# Patient Record
Sex: MALE | Race: BLACK OR AFRICAN AMERICAN | NOT HISPANIC OR LATINO | Employment: FULL TIME | URBAN - METROPOLITAN AREA
[De-identification: names, ages, dates, MRNs, and addresses within clinical notes are randomized per-mention and may not be internally consistent; named-entity substitution may affect disease eponyms.]

---

## 2020-10-21 ENCOUNTER — PATIENT MESSAGE (OUTPATIENT)
Dept: ADMINISTRATIVE | Facility: OTHER | Age: 35
End: 2020-10-21

## 2020-10-21 ENCOUNTER — OFFICE VISIT (OUTPATIENT)
Dept: INTERNAL MEDICINE | Facility: CLINIC | Age: 35
End: 2020-10-21
Payer: COMMERCIAL

## 2020-10-21 VITALS
HEIGHT: 75 IN | WEIGHT: 304 LBS | HEART RATE: 77 BPM | SYSTOLIC BLOOD PRESSURE: 158 MMHG | OXYGEN SATURATION: 96 % | DIASTOLIC BLOOD PRESSURE: 112 MMHG | BODY MASS INDEX: 37.8 KG/M2

## 2020-10-21 DIAGNOSIS — M25.562 CHRONIC PAIN OF LEFT KNEE: ICD-10-CM

## 2020-10-21 DIAGNOSIS — M25.562 LEFT KNEE PAIN, UNSPECIFIED CHRONICITY: Primary | ICD-10-CM

## 2020-10-21 DIAGNOSIS — G89.29 CHRONIC PAIN OF LEFT KNEE: ICD-10-CM

## 2020-10-21 DIAGNOSIS — I10 BENIGN ESSENTIAL HYPERTENSION: ICD-10-CM

## 2020-10-21 DIAGNOSIS — Z76.89 ENCOUNTER TO ESTABLISH CARE WITH NEW DOCTOR: Primary | ICD-10-CM

## 2020-10-21 DIAGNOSIS — E11.9 TYPE 2 DIABETES MELLITUS WITHOUT COMPLICATION, WITHOUT LONG-TERM CURRENT USE OF INSULIN: ICD-10-CM

## 2020-10-21 PROCEDURE — 3080F PR MOST RECENT DIASTOLIC BLOOD PRESSURE >= 90 MM HG: ICD-10-PCS | Mod: CPTII,S$GLB,, | Performed by: INTERNAL MEDICINE

## 2020-10-21 PROCEDURE — 99999 PR PBB SHADOW E&M-NEW PATIENT-LVL IV: CPT | Mod: PBBFAC,,, | Performed by: INTERNAL MEDICINE

## 2020-10-21 PROCEDURE — 99204 OFFICE O/P NEW MOD 45 MIN: CPT | Mod: S$GLB,,, | Performed by: INTERNAL MEDICINE

## 2020-10-21 PROCEDURE — 3077F PR MOST RECENT SYSTOLIC BLOOD PRESSURE >= 140 MM HG: ICD-10-PCS | Mod: CPTII,S$GLB,, | Performed by: INTERNAL MEDICINE

## 2020-10-21 PROCEDURE — 3008F BODY MASS INDEX DOCD: CPT | Mod: CPTII,S$GLB,, | Performed by: INTERNAL MEDICINE

## 2020-10-21 PROCEDURE — 3008F PR BODY MASS INDEX (BMI) DOCUMENTED: ICD-10-PCS | Mod: CPTII,S$GLB,, | Performed by: INTERNAL MEDICINE

## 2020-10-21 PROCEDURE — 99999 PR PBB SHADOW E&M-NEW PATIENT-LVL IV: ICD-10-PCS | Mod: PBBFAC,,, | Performed by: INTERNAL MEDICINE

## 2020-10-21 PROCEDURE — 99204 PR OFFICE/OUTPT VISIT, NEW, LEVL IV, 45-59 MIN: ICD-10-PCS | Mod: S$GLB,,, | Performed by: INTERNAL MEDICINE

## 2020-10-21 PROCEDURE — 3077F SYST BP >= 140 MM HG: CPT | Mod: CPTII,S$GLB,, | Performed by: INTERNAL MEDICINE

## 2020-10-21 PROCEDURE — 3080F DIAST BP >= 90 MM HG: CPT | Mod: CPTII,S$GLB,, | Performed by: INTERNAL MEDICINE

## 2020-10-21 RX ORDER — IBUPROFEN 100 MG/5ML
1000 SUSPENSION, ORAL (FINAL DOSE FORM) ORAL DAILY
COMMUNITY
Start: 2020-10-21 | End: 2023-01-11

## 2020-10-21 RX ORDER — LANCETS
1 EACH MISCELLANEOUS
Qty: 200 EACH | Refills: 4 | Status: SHIPPED | OUTPATIENT
Start: 2020-10-21 | End: 2022-08-24 | Stop reason: SDUPTHER

## 2020-10-21 RX ORDER — INSULIN PUMP SYRINGE, 3 ML
EACH MISCELLANEOUS
Qty: 1 EACH | Refills: 0 | Status: SHIPPED | OUTPATIENT
Start: 2020-10-21 | End: 2023-05-03

## 2020-10-21 RX ORDER — METFORMIN HYDROCHLORIDE 1000 MG/1
1000 TABLET ORAL 2 TIMES DAILY WITH MEALS
Qty: 180 TABLET | Refills: 1 | Status: SHIPPED | OUTPATIENT
Start: 2020-10-21 | End: 2021-05-21 | Stop reason: SDUPTHER

## 2020-10-21 RX ORDER — ZINC GLUCONATE 50 MG
50 TABLET ORAL DAILY
COMMUNITY
Start: 2020-10-21 | End: 2023-01-11

## 2020-10-21 RX ORDER — ACETAMINOPHEN 500 MG
5000 TABLET ORAL DAILY
COMMUNITY
Start: 2020-10-21 | End: 2023-01-11

## 2020-10-21 RX ORDER — AMLODIPINE BESYLATE 10 MG/1
10 TABLET ORAL DAILY
Qty: 90 TABLET | Refills: 1 | Status: SHIPPED | OUTPATIENT
Start: 2020-10-21 | End: 2021-05-21 | Stop reason: SDUPTHER

## 2020-10-21 RX ORDER — LOSARTAN POTASSIUM 100 MG/1
100 TABLET ORAL DAILY
Qty: 90 TABLET | Refills: 1 | Status: SHIPPED | OUTPATIENT
Start: 2020-10-21 | End: 2021-05-21 | Stop reason: SDUPTHER

## 2020-10-21 NOTE — PROGRESS NOTES
INTERNAL MEDICINE CLINIC    Initial Visit to Establish Care    PRESENTING HISTORY     Previous PCP: Primary Doctor None   Chief Complaint/Reason for Visit:  Establish Care.    History of Present Illness & ROS : Mr. Josh Julio is a 35 y.o. male.      Last PCP at [facility].    Review of Systems:  Review of Systems   Constitutional: Negative for chills and fever.   Eyes: Negative for blurred vision.   Respiratory: Negative for cough and sputum production.    Cardiovascular: Negative for chest pain.   Gastrointestinal: Negative for heartburn and nausea.   Musculoskeletal: Positive for joint pain (Left knee). Negative for back pain.   Skin: Positive for rash.   Neurological: Negative for dizziness and headaches.   Endo/Heme/Allergies: Does not bruise/bleed easily.   Psychiatric/Behavioral: Negative for depression.       PAST HISTORY:     Past Medical History:   Diagnosis Date    Benign essential hypertension 10/21/2020    BMI 38.0-38.9,adult 10/21/2020    Essential hypertension     Type 2 diabetes mellitus without complication, without long-term current use of insulin 10/21/2020       History reviewed. No pertinent surgical history.    Family History   Problem Relation Age of Onset    No Known Problems Father     Diabetes Maternal Grandmother     Hypertension Maternal Grandmother        Social History     Socioeconomic History    Marital status: Single     Spouse name: Not on file    Number of children: 3    Years of education: Not on file    Highest education level: Not on file   Occupational History    Not on file   Social Needs    Financial resource strain: Not on file    Food insecurity     Worry: Not on file     Inability: Not on file    Transportation needs     Medical: Not on file     Non-medical: Not on file   Tobacco Use    Smoking status: Never Smoker    Smokeless tobacco: Never Used   Substance and Sexual Activity    Alcohol use: Yes     Frequency: 2-4 times a month     Comment: social     Drug use: No    Sexual activity: Yes     Partners: Female   Lifestyle    Physical activity     Days per week: Not on file     Minutes per session: Not on file    Stress: Not on file   Relationships    Social connections     Talks on phone: Not on file     Gets together: Not on file     Attends [employer] service: Not on file     Active member of club or organization: Not on file     Attends meetings of clubs or organizations: Not on file     Relationship status: Not on file   Other Topics Concern    Not on file   Social History Narrative    Maintenance for [employer]    Engaged.    3 Kids from previous relationship.       MEDICATIONS & ALLERGIES:     Current Outpatient Medications on File Prior to Visit   Medication Sig Dispense Refill    amlodipine (NORVASC) 10 MG tablet Take 1 tablet (10 mg total) by mouth once daily. 30 tablet 0     Review of patient's allergies indicates:  No Known Allergies    Medications Reconciliation:   I have reconciled the patient's home medications with the patient/family. I have updated all changes.  Refer to After-Visit Medication List.    OBJECTIVE:     Vital Signs:  Vitals:    10/21/20 1051   BP: (!) 158/112   Pulse: 77     Wt Readings from Last 1 Encounters:   10/21/20 1051 (!) 137.9 kg (304 lb)     Body mass index is 38 kg/m².     Physical Exam:  General: Well developed, well nourished. No distress.  HEENT: Head is normocephalic, atraumatic  Eyes: Clear conjunctiva.  Neck: Supple, symmetrical neck; trachea midline.  Lungs: Clear to auscultation bilaterally and normal respiratory effort.  Cardiovascular: Heart with regular rate and rhythm.    Extremities: No LE edema.    Abdomen: Abdomen is soft, non-tender non-distended with normal bowel sounds.  Musculoskeletal: Normal gait.   Genital:  Normal penis.   No rash in the genital area.  Scrotum and epididymis normal. No inguinal hernia.  No inguinal nodes.   Rectal: No perianal lesions or rash.   Psychiatric: Normal  affect. Alert.        ASSESSMENT & PLAN:   New patient who has not seen Primary Care Provider at [facility] for the past 3 years.  Patient is new to me. Medical, surgical, social, medication and allergy histories were obtained during this visit.    Encounter to establish care with new doctor  -     Lipid Panel; Future; Expected date: 10/21/2020  -     CBC auto differential; Future; Expected date: 10/21/2020  -     Comprehensive Metabolic Panel; Future; Expected date: 10/21/2020  -     TSH; Future; Expected date: 10/21/2020  -     Hemoglobin A1C; Future; Expected date: 10/21/2020  -     Vitamin D; Future; Expected date: 04/19/2021  -     Hepatitis C Antibody; Future; Expected date: 10/21/2020  -     HIV 1/2 Ag/Ab (4th Gen); Future; Expected date: 10/21/2020    Benign essential hypertension  - Uncontrolled.     Plan:    -     NURSING COMMUNICATION: Create [facility] Account  -     Hypertension Digital Medicine (HDMP) Enrollment Order  -     Hypertension Digital Medicine (HDMP): Assign Onboarding Questionnaires  -     amLODIPine (NORVASC) 10 MG tablet; Take 1 tablet (10 mg total) by mouth once daily.  Dispense: 90 tablet; Refill: 1  -     losartan (COZAAR) 100 MG tablet; Take 1 tablet (100 mg total) by mouth once daily.  Dispense: 90 tablet; Refill: 1    Type 2 diabetes mellitus without complication, without long-term current use of insulin  - Girlfriend states his blood sugar was 200s X 2 before meal.    Plan:  -     blood sugar diagnostic Strp; 1 strip by Misc.(Non-Drug; Combo Route) route 2 (two) times daily before meals.  Dispense: 200 strip; Refill: 4  -     lancets (LANCETS,ULTRA THIN) Misc; 1 application by Misc.(Non-Drug; Combo Route) route 2 (two) times daily before meals.  Dispense: 200 each; Refill: 4  -     blood-glucose meter kit; Use as instructed  Dispense: 1 each; Refill: 0  -     metFORMIN (GLUCOPHAGE) 1000 MG tablet; Take 1 tablet (1,000 mg total) by mouth 2 (two) times daily with meals.  Dispense: 180  tablet; Refill: 1      BMI 38.0-38.9,adult  -     ascorbic acid, vitamin C, (VITAMIN C) 1000 MG tablet; Take 1 tablet (1,000 mg total) by mouth once daily.  -     cholecalciferol, vitamin D3, 125 mcg (5,000 unit) Tab; Take 1 tablet (5,000 Units total) by mouth once daily.  -     zinc gluconate 50 mg tablet; Take 1 tablet (50 mg total) by mouth once daily.  Dispense:        Chronic pain of left knee  -     Ambulatory referral/consult to Orthopedics; Future; Expected date: 10/28/2020    Preventive Health Maintenance:  Tdap and Flu today.    Return to Clinic for Follow Up with me:   December    Scheduled Follow-up :  Future Appointments   Date Time Provider Department Center   10/22/2020  7:00 AM Kali Stanley PA-C [facility]   10/24/2020  9:20 AM [facility]   12/29/2020  3:30 PM Rodri Armstrong MD [facility]       After Visit Medication List :     Medication List          Accurate as of October 21, 2020 11:50 AM. If you have any questions, ask your nurse or doctor.            START taking these medications    ascorbic acid (vitamin C) 1000 MG tablet  Commonly known as: VITAMIN C  Take 1 tablet (1,000 mg total) by mouth once daily.  Started by: Rodri Armstrong MD     blood sugar diagnostic Strp  1 strip by Misc.(Non-Drug; Combo Route) route 2 (two) times daily before meals.  Started by: Rodri Armstrong MD     blood-glucose meter kit  Use as instructed  Started by: Rodri Armstrong MD     cholecalciferol (vitamin D3) 125 mcg (5,000 unit) Tab  Take 1 tablet (5,000 Units total) by mouth once daily.  Started by: Rodri Armstrong MD     lancets Misc  Commonly known as: LANCETS,ULTRA THIN  1 application by Misc.(Non-Drug; Combo Route) route 2 (two) times daily before meals.  Started by: Rodri Armstrong MD     losartan 100 MG tablet  Commonly known as: COZAAR  Take 1 tablet (100 mg total) by mouth once daily.  Started by: Rodri Armstrong MD     metFORMIN 1000 MG  tablet  Commonly known as: GLUCOPHAGE  Take 1 tablet (1,000 mg total) by mouth 2 (two) times daily with meals.  Started by: Rodri Armstrong MD     zinc gluconate 50 mg tablet  Take 1 tablet (50 mg total) by mouth once daily.  Started by: Rodri Armstrong MD        CONTINUE taking these medications    amLODIPine 10 MG tablet  Commonly known as: NORVASC  Take 1 tablet (10 mg total) by mouth once daily.        STOP taking these medications    cloNIDine 0.1 MG tablet  Commonly known as: CATAPRES  Stopped by: Rodri Armstrong MD     fluocinonide 0.05% 0.05 % cream  Commonly known as: LIDEX  Stopped by: Rodri Armstrong MD     triamcinolone acetonide 0.1% 0.1 % ointment  Commonly known as: KENALOG  Stopped by: Rodri Armstrong MD           Where to Get Your Medications      These medications were sent to [facility] [address] [place] [id number]    Phone: [phone]   · amLODIPine 10 MG tablet  · blood sugar diagnostic Strp  · blood-glucose meter kit  · lancets Misc  · losartan 100 MG tablet  · metFORMIN 1000 MG tablet     You can get these medications from any pharmacy    You don't need a prescription for these medications  · ascorbic acid (vitamin C) 1000 MG tablet  · cholecalciferol (vitamin D3) 125 mcg (5,000 unit) Tab  · zinc gluconate 50 mg tablet         Signing Physician:  Rodri Armstrong MD

## 2020-10-24 ENCOUNTER — PATIENT MESSAGE (OUTPATIENT)
Dept: INTERNAL MEDICINE | Facility: CLINIC | Age: 35
End: 2020-10-24

## 2020-10-24 ENCOUNTER — LAB VISIT (OUTPATIENT)
Dept: LAB | Facility: HOSPITAL | Age: 35
End: 2020-10-24
Attending: INTERNAL MEDICINE
Payer: COMMERCIAL

## 2020-10-24 ENCOUNTER — TELEPHONE (OUTPATIENT)
Dept: HEPATOLOGY | Facility: CLINIC | Age: 35
End: 2020-10-24

## 2020-10-24 DIAGNOSIS — I10 BENIGN ESSENTIAL HYPERTENSION: ICD-10-CM

## 2020-10-24 DIAGNOSIS — E11.65 TYPE 2 DIABETES MELLITUS WITH HYPERGLYCEMIA, WITHOUT LONG-TERM CURRENT USE OF INSULIN: Primary | ICD-10-CM

## 2020-10-24 DIAGNOSIS — E11.9 TYPE 2 DIABETES MELLITUS WITHOUT COMPLICATION, WITHOUT LONG-TERM CURRENT USE OF INSULIN: ICD-10-CM

## 2020-10-24 DIAGNOSIS — Z76.89 ENCOUNTER TO ESTABLISH CARE WITH NEW DOCTOR: ICD-10-CM

## 2020-10-24 DIAGNOSIS — E78.2 MIXED HYPERLIPIDEMIA: ICD-10-CM

## 2020-10-24 PROBLEM — E55.9 VITAMIN D INSUFFICIENCY: Status: ACTIVE | Noted: 2020-10-24

## 2020-10-24 LAB
25(OH)D3+25(OH)D2 SERPL-MCNC: 26 NG/ML (ref 30–96)
ALBUMIN SERPL BCP-MCNC: 4.1 G/DL (ref 3.5–5.2)
ALP SERPL-CCNC: 94 U/L (ref 55–135)
ALT SERPL W/O P-5'-P-CCNC: 34 U/L (ref 10–44)
ANION GAP SERPL CALC-SCNC: 10 MMOL/L (ref 8–16)
AST SERPL-CCNC: 28 U/L (ref 10–40)
BASOPHILS # BLD AUTO: 0.03 K/UL (ref 0–0.2)
BASOPHILS NFR BLD: 0.5 % (ref 0–1.9)
BILIRUB SERPL-MCNC: 1.1 MG/DL (ref 0.1–1)
BUN SERPL-MCNC: 16 MG/DL (ref 6–20)
CALCIUM SERPL-MCNC: 9.4 MG/DL (ref 8.7–10.5)
CHLORIDE SERPL-SCNC: 101 MMOL/L (ref 95–110)
CHOLEST SERPL-MCNC: 186 MG/DL (ref 120–199)
CHOLEST/HDLC SERPL: 4.7 {RATIO} (ref 2–5)
CO2 SERPL-SCNC: 27 MMOL/L (ref 23–29)
CREAT SERPL-MCNC: 1 MG/DL (ref 0.5–1.4)
DIFFERENTIAL METHOD: ABNORMAL
EOSINOPHIL # BLD AUTO: 0.1 K/UL (ref 0–0.5)
EOSINOPHIL NFR BLD: 2.2 % (ref 0–8)
ERYTHROCYTE [DISTWIDTH] IN BLOOD BY AUTOMATED COUNT: 15.9 % (ref 11.5–14.5)
EST. GFR  (AFRICAN AMERICAN): >60 ML/MIN/1.73 M^2
EST. GFR  (NON AFRICAN AMERICAN): >60 ML/MIN/1.73 M^2
ESTIMATED AVG GLUCOSE: 352 MG/DL (ref 68–131)
GLUCOSE SERPL-MCNC: 226 MG/DL (ref 70–110)
HBA1C MFR BLD HPLC: 13.9 % (ref 4–5.6)
HCT VFR BLD AUTO: 41.2 % (ref 40–54)
HDLC SERPL-MCNC: 40 MG/DL (ref 40–75)
HDLC SERPL: 21.5 % (ref 20–50)
HGB BLD-MCNC: 13.2 G/DL (ref 14–18)
IMM GRANULOCYTES # BLD AUTO: 0.02 K/UL (ref 0–0.04)
IMM GRANULOCYTES NFR BLD AUTO: 0.3 % (ref 0–0.5)
LDLC SERPL CALC-MCNC: 71.4 MG/DL (ref 63–159)
LYMPHOCYTES # BLD AUTO: 2.1 K/UL (ref 1–4.8)
LYMPHOCYTES NFR BLD: 36 % (ref 18–48)
MCH RBC QN AUTO: 24.3 PG (ref 27–31)
MCHC RBC AUTO-ENTMCNC: 32 G/DL (ref 32–36)
MCV RBC AUTO: 76 FL (ref 82–98)
MONOCYTES # BLD AUTO: 0.6 K/UL (ref 0.3–1)
MONOCYTES NFR BLD: 9.3 % (ref 4–15)
NEUTROPHILS # BLD AUTO: 3.1 K/UL (ref 1.8–7.7)
NEUTROPHILS NFR BLD: 51.7 % (ref 38–73)
NONHDLC SERPL-MCNC: 146 MG/DL
NRBC BLD-RTO: 0 /100 WBC
PLATELET # BLD AUTO: 264 K/UL (ref 150–350)
PLATELET BLD QL SMEAR: ABNORMAL
PMV BLD AUTO: 10 FL (ref 9.2–12.9)
POTASSIUM SERPL-SCNC: 4.2 MMOL/L (ref 3.5–5.1)
PROT SERPL-MCNC: 7.6 G/DL (ref 6–8.4)
RBC # BLD AUTO: 5.43 M/UL (ref 4.6–6.2)
SODIUM SERPL-SCNC: 138 MMOL/L (ref 136–145)
TRIGL SERPL-MCNC: 373 MG/DL (ref 30–150)
TSH SERPL DL<=0.005 MIU/L-ACNC: 1.77 UIU/ML (ref 0.4–4)
WBC # BLD AUTO: 5.94 K/UL (ref 3.9–12.7)

## 2020-10-24 PROCEDURE — 86703 HIV-1/HIV-2 1 RESULT ANTBDY: CPT

## 2020-10-24 PROCEDURE — 80053 COMPREHEN METABOLIC PANEL: CPT

## 2020-10-24 PROCEDURE — 86803 HEPATITIS C AB TEST: CPT

## 2020-10-24 PROCEDURE — 84443 ASSAY THYROID STIM HORMONE: CPT

## 2020-10-24 PROCEDURE — 85025 COMPLETE CBC W/AUTO DIFF WBC: CPT

## 2020-10-24 PROCEDURE — 82306 VITAMIN D 25 HYDROXY: CPT

## 2020-10-24 PROCEDURE — 83036 HEMOGLOBIN GLYCOSYLATED A1C: CPT

## 2020-10-24 PROCEDURE — 80061 LIPID PANEL: CPT

## 2020-10-24 PROCEDURE — 36415 COLL VENOUS BLD VENIPUNCTURE: CPT

## 2020-10-24 RX ORDER — ATORVASTATIN CALCIUM 10 MG/1
10 TABLET, FILM COATED ORAL DAILY
Qty: 90 TABLET | Refills: 3 | Status: SHIPPED | OUTPATIENT
Start: 2020-10-24 | End: 2021-05-21 | Stop reason: SDUPTHER

## 2020-10-24 RX ORDER — DULAGLUTIDE 1.5 MG/.5ML
1.5 INJECTION, SOLUTION SUBCUTANEOUS
Qty: 12 PEN | Refills: 11 | Status: SHIPPED | OUTPATIENT
Start: 2020-10-24 | End: 2021-06-17

## 2020-10-24 NOTE — PROGRESS NOTES
Josh:    Your A1c is > 13. Very controlled and serious diabetes.    I have called in prescription for:    1. Trulicity 1.5 mg injection.  This is once a weekly injection for diabetes and weight loss.  2. Atorvastatin for cholesterol problem and diabetes.    I am also asking our diabetes specialist, Sudarshan Soto, in our clinic to arrange appointment to see you for further diabetes management.

## 2020-10-26 ENCOUNTER — PATIENT OUTREACH (OUTPATIENT)
Dept: OTHER | Facility: OTHER | Age: 35
End: 2020-10-26

## 2020-10-26 PROBLEM — R76.8 HCV ANTIBODY POSITIVE: Status: ACTIVE | Noted: 2020-10-26

## 2020-10-26 LAB
HCV AB SERPL QL IA: POSITIVE
HIV 1+2 AB+HIV1 P24 AG SERPL QL IA: NEGATIVE

## 2020-10-28 ENCOUNTER — PATIENT OUTREACH (OUTPATIENT)
Dept: OTHER | Facility: OTHER | Age: 35
End: 2020-10-28

## 2020-11-04 ENCOUNTER — NURSE TRIAGE (OUTPATIENT)
Dept: ADMINISTRATIVE | Facility: CLINIC | Age: 35
End: 2020-11-04

## 2020-11-04 NOTE — TELEPHONE ENCOUNTER
Pt stated he took 2 benadryl for the swelling, but it didn't help. Also stated he feels like a lump behind the ear. Pt scheduled with DEVEN Kurtz on tomorrow November 5th.

## 2020-11-04 NOTE — PROGRESS NOTES
"INTERNAL MEDICINE CLINIC - SAME DAY APPOINTMENT  Progress Note    PRESENTING HISTORY     PCP: Rodri Armstrong MD  Chief Complaint/Reason for Visit:   No chief complaint on file.    History of Present Illness & ROS : Mr. Josh Julio is a 35 y.o. male.    Here for Same Day appt. Very pleasant young man.   New to this Provider. Recently est'd care with Dr. ADALBERTO Armstrong.   Reports that his fiance' noticed that he has some "swelling to his ear and behind the ear".   Patient reports that "didn't know it was there, but get scaly rash behind the right ear every now and then, to my scalp and eyebrows as well". Denies pain to the area, ear pain, ringing in the ear.   Has not tried anything for it.       Review of Systems:  Eyes: denies visual changes at this time denies floaters   ENT: no nasal congestion or sore throat  Respiratory: no cough or shorness of breath  Cardiovascular: no chest pain or palpitations  Gastrointestinal: no nausea or vomiting, no abdominal pain or change in bowel habits  Genitourinary: no hematuria or dysuria; denies frequency  Hematologic/Lymphatic: no easy bruising or lymphadenopathy  Musculoskeletal: no arthralgias or myalgias  Neurological: no seizures or tremors  Endocrine: no heat or cold intolerance      PAST HISTORY:     Past Medical History:   Diagnosis Date    Benign essential hypertension 10/21/2020    BMI 38.0-38.9,adult 10/21/2020    Essential hypertension     Mixed hyperlipidemia 10/24/2020    Type 2 diabetes mellitus without complication, without long-term current use of insulin 10/21/2020    Vitamin D insufficiency 10/24/2020       No past surgical history on file.    Family History   Problem Relation Age of Onset    No Known Problems Father     Diabetes Maternal Grandmother     Hypertension Maternal Grandmother        Social History     Socioeconomic History    Marital status: Single     Spouse name: Not on file    Number of children: 3    Years of education: Not on file    " Highest education level: Not on file   Occupational History    Not on file   Social Needs    Financial resource strain: Not on file    Food insecurity     Worry: Not on file     Inability: Not on file    Transportation needs     Medical: Not on file     Non-medical: Not on file   Tobacco Use    Smoking status: Never Smoker    Smokeless tobacco: Never Used   Substance and Sexual Activity    Alcohol use: Yes     Frequency: 2-4 times a month     Comment: social    Drug use: No    Sexual activity: Yes     Partners: Female   Lifestyle    Physical activity     Days per week: Not on file     Minutes per session: Not on file    Stress: Not on file   Relationships    Social connections     Talks on phone: Not on file     Gets together: Not on file     Attends [employer] service: Not on file     Active member of club or organization: Not on file     Attends meetings of clubs or organizations: Not on file     Relationship status: Not on file   Other Topics Concern    Not on file   Social History Narrative    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    3 Kids from previous relationship.       MEDICATIONS & ALLERGIES:     Current Outpatient Medications on File Prior to Visit   Medication Sig Dispense Refill    amLODIPine (NORVASC) 10 MG tablet Take 1 tablet (10 mg total) by mouth once daily. 90 tablet 1    ascorbic acid, vitamin C, (VITAMIN C) 1000 MG tablet Take 1 tablet (1,000 mg total) by mouth once daily.      atorvastatin (LIPITOR) 10 MG tablet Take 1 tablet (10 mg total) by mouth once daily. 90 tablet 3    blood sugar diagnostic Strp 1 strip by Misc.(Non-Drug; Combo Route) route 2 (two) times daily before meals. 200 strip 4    blood-glucose meter kit Use as instructed 1 each 0    cholecalciferol, vitamin D3, 125 mcg (5,000 unit) Tab Take 1 tablet (5,000 Units total) by mouth once daily.      dulaglutide (TRULICITY) 1.5 mg/0.5 mL pen injector Inject 1.5 mg into the skin every 7 days. 12 pen 11     lancets (LANCETS,ULTRA THIN) Misc 1 application by Misc.(Non-Drug; Combo Route) route 2 (two) times daily before meals. 200 each 4    losartan (COZAAR) 100 MG tablet Take 1 tablet (100 mg total) by mouth once daily. 90 tablet 1    metFORMIN (GLUCOPHAGE) 1000 MG tablet Take 1 tablet (1,000 mg total) by mouth 2 (two) times daily with meals. 180 tablet 1    zinc gluconate 50 mg tablet Take 1 tablet (50 mg total) by mouth once daily.       No current facility-administered medications on file prior to visit.         Review of patient's allergies indicates:  No Known Allergies    Medications Reconciliation:   I have reconciled the patient's home medications with the patient/family. I have updated all changes.  Refer to After-Visit Medication List.    OBJECTIVE:     Vital Signs:  There were no vitals filed for this visit.  Wt Readings from Last 1 Encounters:   10/21/20 1051 (!) 137.9 kg (304 lb)     There is no height or weight on file to calculate BMI.     Wt Readings from Last 3 Encounters:   11/05/20 (!) 137.4 kg (302 lb 14.6 oz)   10/21/20 (!) 137.9 kg (304 lb)   09/07/16 (!) 140.6 kg (310 lb)     Temp Readings from Last 3 Encounters:   09/07/16 98.2 °F (36.8 °C) (Oral)   12/05/15 98.9 °F (37.2 °C) (Oral)   04/04/13 98.5 °F (36.9 °C) (Oral)     BP Readings from Last 3 Encounters:   11/05/20 (!) 150/112   10/21/20 (!) 158/112   09/07/16 126/82     Pulse Readings from Last 3 Encounters:   11/05/20 90   10/21/20 77   09/07/16 85         Physical Exam:  General: Well developed, well nourished. No distress.  HEENT: Head is normocephalic, atraumatic; ears are normal.   Eyes: Clear conjunctiva.  Neck: Supple, symmetrical neck; trachea midline.  Lungs: Clear to auscultation bilaterally and normal respiratory effort.  Cardiovascular: Heart with regular rate and rhythm. No murmurs, gallops or rubs  Extremities: No LE edema. Pulses 2+ and symmetric.   Abdomen: Abdomen is soft, non-tender non-distended with normal bowel  sounds.  Skin: Skin color, texture, turgor normal.   (See photos below)  Musculoskeletal: Normal gait.   Lymph Nodes: No cervical or supraclavicular adenopathy.  Neurologic: Normal strength and tone. No focal numbness or weakness.   Psychiatric: Not depressed.        Laboratory  Lab Results   Component Value Date    WBC 5.94 10/24/2020    HGB 13.2 (L) 10/24/2020    HCT 41.2 10/24/2020     10/24/2020    CHOL 186 10/24/2020    TRIG 373 (H) 10/24/2020    HDL 40 10/24/2020    ALT 34 10/24/2020    AST 28 10/24/2020     10/24/2020    K 4.2 10/24/2020     10/24/2020    CREATININE 1.0 10/24/2020    BUN 16 10/24/2020    CO2 27 10/24/2020    TSH 1.768 10/24/2020    HGBA1C 13.9 (H) 10/24/2020         ASSESSMENT & PLAN:     Here for Same Day appt.             Seborrheic dermatitis /   Seborrheic dermatitis of scalp    Other orders  -     ketoconazole (NIZORAL) 2 % cream; Apply topically once daily.  Dispense: 1 Tube; Refill: 3  -     ketoconazole (NIZORAL) 2 % shampoo; Apply topically twice a week.  Dispense: 240 mL; Refill: 3    *Advised to follow up with Dr. ADALBERTO Armstrong or I with his response to tx rendered. Advised to continue the applications for a minimum of 2 weeks, but if not resolved to extend for a full 4 weeks of tx.   If does not improve, consider referral to Dermatology.       Future Appointments   Date Time Provider Department Center   11/9/2020 10:00 AM Lincoln Cox MD [facility]   12/29/2020  3:30 PM Rodri Armstrong MD [facility]        Medication List          Accurate as of November 5, 2020 10:08 AM. If you have any questions, ask your nurse or doctor.            START taking these medications    * ketoconazole 2 % cream  Commonly known as: NIZORAL  Apply topically once daily.  Started by: CRISTOPHER Shields     * ketoconazole 2 % shampoo  Commonly known as: NIZORAL  Apply topically twice a week.  Started by: CRISTOPHER Shields         * This list has 2  medication(s) that are the same as other medications prescribed for you. Read the directions carefully, and ask your doctor or other care provider to review them with you.            CONTINUE taking these medications    amLODIPine 10 MG tablet  Commonly known as: NORVASC  Take 1 tablet (10 mg total) by mouth once daily.     ascorbic acid (vitamin C) 1000 MG tablet  Commonly known as: VITAMIN C  Take 1 tablet (1,000 mg total) by mouth once daily.     atorvastatin 10 MG tablet  Commonly known as: LIPITOR  Take 1 tablet (10 mg total) by mouth once daily.     blood sugar diagnostic Strp  1 strip by Misc.(Non-Drug; Combo Route) route 2 (two) times daily before meals.     blood-glucose meter kit  Use as instructed     cholecalciferol (vitamin D3) 125 mcg (5,000 unit) Tab  Take 1 tablet (5,000 Units total) by mouth once daily.     lancets Misc  Commonly known as: LANCETS,ULTRA THIN  1 application by Misc.(Non-Drug; Combo Route) route 2 (two) times daily before meals.     losartan 100 MG tablet  Commonly known as: COZAAR  Take 1 tablet (100 mg total) by mouth once daily.     metFORMIN 1000 MG tablet  Commonly known as: GLUCOPHAGE  Take 1 tablet (1,000 mg total) by mouth 2 (two) times daily with meals.     TRULICITY 1.5 mg/0.5 mL pen injector  Generic drug: dulaglutide  Inject 1.5 mg into the skin every 7 days.     zinc gluconate 50 mg tablet  Take 1 tablet (50 mg total) by mouth once daily.           Where to Get Your Medications      These medications were sent to [facility]  [address] [facility]  [address] [address]    Phone: [phone]   · ketoconazole 2 % cream  · ketoconazole 2 % shampoo         Signing Physician:  CRISTOPHER Shields

## 2020-11-04 NOTE — TELEPHONE ENCOUNTER
Spoke with pt:   Ear is swollen- swelling is pushing ear out. Behind ear is swollen, feels bump. Glucose= 122 noticed Monday- took benadryl- did not change it.     Has concerns about are any new meds he is taking is causing this. Ear with no pain,no drainage, no fever, closed skin, no redness. No trouble swallowing or breathing.     Protocol advice given and pt verbalizes understanding. Also instructed pt to download [employer] [place] to phone and gave 877 IT number for help with [employer].         Reason for Disposition   Swelling of lymph node suspected   Normal-sized lymph node (i.e., < 1 cm, < 1/2 inch)    Additional Information   Negative: Sounds like a life-threatening emergency to the triager   Negative: Sounds like a life-threatening emergency to the triager   Negative: Node is in the neck and causes difficulty breathing   Negative: Patient sounds very sick or weak to the triager   Negative: Node is in the neck and can't swallow fluids   Negative: Fever > 103 F (39.4 C)   Negative: Lump or swelling in groin and pulsating (like heartbeat)   Negative: Single large node and size > 1 inch (2.5 cm)   Negative: Overlying skin is red   Negative: Rapid increase in size of node over several hours   Negative: Tender node in the groin and has a sore, scratch, cut, or painful red area on that leg   Negative: Tender node in the armpit and has a sore, scratch, cut, or painful red area on that arm   Negative: Tender node in the neck and also has a sore throat with minimal/no runny nose or cough   Negative: Fever present > 3 days (72 hours)   Negative: Large nodes at multiple locations   Negative: Very tender to the touch but no fever   Negative: Patient wants to be seen   Negative: Large node present > 2 weeks   Negative: Normal-sized node (i.e. < 1 cm, <1/2 in) present > 2 weeks, but patient is worried about cancer   Negative: Mildly swollen lymph node present < 2 weeks, and has cold symptoms (e.g., runny  nose, cough, sore throat)    Protocols used: SKIN LUMP OR LOCALIZED SWELLING-A-AH, LYMPH NODES - JWDPRPH-O-DE

## 2020-11-05 ENCOUNTER — OFFICE VISIT (OUTPATIENT)
Dept: INTERNAL MEDICINE | Facility: CLINIC | Age: 35
End: 2020-11-05
Payer: COMMERCIAL

## 2020-11-05 VITALS
HEART RATE: 90 BPM | SYSTOLIC BLOOD PRESSURE: 150 MMHG | HEIGHT: 75 IN | BODY MASS INDEX: 37.67 KG/M2 | WEIGHT: 302.94 LBS | OXYGEN SATURATION: 99 % | DIASTOLIC BLOOD PRESSURE: 112 MMHG

## 2020-11-05 DIAGNOSIS — L21.9 SEBORRHEIC DERMATITIS: Primary | ICD-10-CM

## 2020-11-05 DIAGNOSIS — L21.9 SEBORRHEIC DERMATITIS OF SCALP: ICD-10-CM

## 2020-11-05 PROCEDURE — 3080F DIAST BP >= 90 MM HG: CPT | Mod: CPTII,S$GLB,, | Performed by: NURSE PRACTITIONER

## 2020-11-05 PROCEDURE — 99214 OFFICE O/P EST MOD 30 MIN: CPT | Mod: S$GLB,,, | Performed by: NURSE PRACTITIONER

## 2020-11-05 PROCEDURE — 99999 PR PBB SHADOW E&M-EST. PATIENT-LVL IV: ICD-10-PCS | Mod: PBBFAC,,, | Performed by: NURSE PRACTITIONER

## 2020-11-05 PROCEDURE — 3080F PR MOST RECENT DIASTOLIC BLOOD PRESSURE >= 90 MM HG: ICD-10-PCS | Mod: CPTII,S$GLB,, | Performed by: NURSE PRACTITIONER

## 2020-11-05 PROCEDURE — 99214 PR OFFICE/OUTPT VISIT, EST, LEVL IV, 30-39 MIN: ICD-10-PCS | Mod: S$GLB,,, | Performed by: NURSE PRACTITIONER

## 2020-11-05 PROCEDURE — 3008F BODY MASS INDEX DOCD: CPT | Mod: CPTII,S$GLB,, | Performed by: NURSE PRACTITIONER

## 2020-11-05 PROCEDURE — 99999 PR PBB SHADOW E&M-EST. PATIENT-LVL IV: CPT | Mod: PBBFAC,,, | Performed by: NURSE PRACTITIONER

## 2020-11-05 PROCEDURE — 3077F PR MOST RECENT SYSTOLIC BLOOD PRESSURE >= 140 MM HG: ICD-10-PCS | Mod: CPTII,S$GLB,, | Performed by: NURSE PRACTITIONER

## 2020-11-05 PROCEDURE — 3008F PR BODY MASS INDEX (BMI) DOCUMENTED: ICD-10-PCS | Mod: CPTII,S$GLB,, | Performed by: NURSE PRACTITIONER

## 2020-11-05 PROCEDURE — 3077F SYST BP >= 140 MM HG: CPT | Mod: CPTII,S$GLB,, | Performed by: NURSE PRACTITIONER

## 2020-11-05 RX ORDER — KETOCONAZOLE 20 MG/G
CREAM TOPICAL DAILY
Qty: 1 TUBE | Refills: 3 | Status: SHIPPED | OUTPATIENT
Start: 2020-11-05 | End: 2022-08-17

## 2020-11-05 RX ORDER — KETOCONAZOLE 20 MG/ML
SHAMPOO, SUSPENSION TOPICAL
Qty: 240 ML | Refills: 3 | Status: SHIPPED | OUTPATIENT
Start: 2020-11-05 | End: 2022-07-28 | Stop reason: SDUPTHER

## 2020-11-12 NOTE — PROGRESS NOTES
Digital Medicine: Health  Introduction    Introduced Josh Julio to Digital Medicine. Discussed health  role and recommended lifestyle modifications.    The history is provided by the patient.           Additional Enrollment Details: Reviewed recent readings with patient to ensure they are coming through correctly. He states he has been very busy with work so he has not take readings lately but is agreeable to start. He normally takes his antihypertensives with breakfast around 7 AM. Discussed taking readings 1+ hr after medications. Identified that evening may be best because he works 8-5.     HYPERTENSION  Explained hypertension digital medicine goals including BP goal less than or equal to 130/80mmHg, improved convenience of BP management and reduced risk of heart attack, kidney failure, stroke, eye disease, dementia, and death.      Explained non-pharmacologic therapies like low salt diet and physical activity can reduce blood pressure. .      Explained that we expect patient to submit several blood pressure readings per week at random times of the day, but at least 30 minutes after taking blood pressure medications. Instructed patient not to allow anyone else to use their blood pressure monitor and phone as data submitted is directly entered into medical record. Reviewed and confirmed appropriate blood pressure monitoring technique.         Patient's BP goal is less than or equal to 130/80.Patient's BP average is 166/115 mmHg, which is above goal, per 2017 ACC/AHA Hypertension Guidelines.    Explained to the patient that the Digital Medicine team is not available for emergencies. Advised patient call [facility] ([phone] or [phone]) or [phone] if needed.                 Diet-Assessed  24 hour dietary recall  Breakfast is typically between 7-8 am. Grits with cheese and smoked sausage from corner store.  Lunch is typically between. Chicken nuggets from [employer] or [employer].   Dinner  is typically between. Salad with ranch or chicken salad, prepared at home.     Additional diet details: We discussed lower sodium alternatives for the foods he is currently eating. Patient is agreeable to see what other breakfast foods are available at the corner store, switch to grilled chicken or homemade lunch, and choose a lower sodium salad dressing. Patient is agreeable to receive resources for low sodium salad dressings and tips on eating healthily when dining out.     Patient states he reads food labels and feels like everything has sugar in it. He tries to watch this to control his blood sugar.     He does not get fries or a drink from [employer] or [employer], I encouraged him to continue limiting this.    Physical Activity-Not assessed    Medication Adherence-Medication Adherence not addressed.      Substance, Sleep, Stress-Not assessed      Additional monitoring needed.  Provided patient education.  Reviewed Device Techniques.     Addressed patient questions and patient has my contact information if needed prior to next outreach. Patient verbalizes understanding.      Explained the importance of self-monitoring and medication adherence. Encouraged the patient to communicate with their health  for lifestyle modifications to help improve or maintain a healthy lifestyle.        Sent link to [facility] webpages and my contact information via "[employer] for future questions.        Explained to the patient that the Digital Medicine team is not available for emergencies. Advised patient call [facility] ([phone] or [phone]) or [phone] if needed.                Topic    Eye Exam          Last 5 Patient Entered Readings                                      Current 30 Day Average: 166/115     Recent Readings 10/26/2020 10/23/2020 10/23/2020 10/22/2020 10/22/2020    SBP (mmHg) 164 168 174 151 164    DBP (mmHg) 112 109 120 108 114    Pulse 104 104 91 111 92

## 2020-11-16 NOTE — PROGRESS NOTES
Digital Medicine: Clinician Introduction    Josh Julio is a 35 y.o. male who is newly enrolled in the Digital Medicine Clinic.    Spoke with patient regarding introduction to [facility].    Patient states he has been busy with constantly working and plans to resume monitoring. Advised patient to charge cuff prior to monitoring. He confirms current medications  And states he takes them every morning after eating.    The history is provided by the patient.      Review of patient's allergies indicates:  No Known Allergies  Completed Medication Reconciliation  Verified pharmacy information.    HYPERTENSION  Explained hypertension digital medicine goals including BP goal less than or equal to 130/80mmHg, improved convenience of BP management and reduced risk of heart attack, kidney failure, stroke, eye disease, dementia, and death.     Explained non-pharmacologic therapies like low salt diet and physical activity can reduce blood pressure.       Explained that we expect patient to submit several blood pressure readings per week at random times of the day, but at least 30 minutes after taking blood pressure medications. Instructed patient not to allow anyone else to use their blood pressure monitor and phone as data submitted is directly entered into medical record. Reviewed and confirmed appropriate blood pressure monitoring technique.         Reviewed signs/symptoms of hypertension (headache, changes in vision, chest pain, shortness of breath)   Reviewed signs/symptoms of hypotension (lightheaded, dizziness, weakness)     Patient's BP goal is less than or equal to 130/80. Patients BP average is 166/115 mmHg, which is above goal, per 2017 ACC/AHA Hypertension Guidelines.     Last 5 Patient Entered Readings                                      Current 30 Day Average: 166/115     Recent Readings 10/26/2020 10/23/2020 10/23/2020 10/22/2020 10/22/2020    SBP (mmHg) 164 168 174 151 164    DBP (mmHg) 112 109 120 108 114     Pulse 104 104 91 111 92          Depression Screening  Josh Julio did not answer the depression screening.     Sleep Apnea Screening  Patient not previously diagnosed with SOPHIA       Medication Affordability Screening  Patient did not answer the medication affordability questionnaires. Patient is currently not having problems affording medications    Medication Adherence-Medication adherence was assessed.  Patient continue taking medication as prescribed.            ASSESSMENT(S)  Patients BP average is 166/115 mmHg, which is above goal. Patient's BP goal is less than or equal to 130/80.     Hypertension Plan  Additional monitoring needed.  Continue current therapy.  Instructed to charge device.  Provided patient education.  Consider alternate ARB and/or addition of thiazide for additional blood pressure lowering. CMP wnl 10/24/2020.  Reviewed cuff charging recommendations with patient - charge cuff for ~8 hours once every month or when battery life reaches 25% or below.     Addressed patient questions and patient has my contact information if needed prior to next outreach. Patient verbalizes understanding.      Explained the importance of self-monitoring and medication adherence. Encouraged the patient to communicate with their health  for lifestyle modifications to help improve or maintain a healthy lifestyle.        Sent link to [facility] webpages and my contact information via [employer] for future questions.        Explained to the patient that the Digital Medicine team is not available for emergencies. Advised patient call [facility] ([phone] or [phone]) or [phone] if needed.                Topic    Eye Exam           Current Medication Regimen:  Hypertension Medications             amLODIPine (NORVASC) 10 MG tablet Take 1 tablet (10 mg total) by mouth once daily.    losartan (COZAAR) 100 MG tablet Take 1 tablet (100 mg total) by mouth once daily.

## 2020-11-17 ENCOUNTER — PATIENT OUTREACH (OUTPATIENT)
Dept: ADMINISTRATIVE | Facility: OTHER | Age: 35
End: 2020-11-17

## 2020-11-17 DIAGNOSIS — E11.65 TYPE 2 DIABETES MELLITUS WITH HYPERGLYCEMIA, WITHOUT LONG-TERM CURRENT USE OF INSULIN: Primary | ICD-10-CM

## 2020-11-17 NOTE — PROGRESS NOTES
Health Maintenance Due   Topic Date Due    Foot Exam  05/10/1993    Eye Exam  05/10/1993    TETANUS VACCINE  05/10/2001    Pneumococcal Vaccine (Medium Risk) (1 of 1 - PPSV23) 05/10/2002    Influenza Vaccine (1) 08/01/2020     Updates were requested from care everywhere.  Chart was reviewed for overdue Proactive [facility] Encounters (IVANA) topics (CRS, Breast Cancer Screening, Eye exam)  Health Maintenance has been updated.  LINKS immunization registry triggered.  Immunizations were reconciled.  Order placed for diabetic eye screening photo.

## 2020-12-01 ENCOUNTER — PATIENT OUTREACH (OUTPATIENT)
Dept: OTHER | Facility: OTHER | Age: 35
End: 2020-12-01

## 2020-12-01 DIAGNOSIS — I10 BENIGN ESSENTIAL HYPERTENSION: Primary | ICD-10-CM

## 2020-12-08 ENCOUNTER — PATIENT OUTREACH (OUTPATIENT)
Dept: OTHER | Facility: OTHER | Age: 35
End: 2020-12-08

## 2020-12-08 NOTE — PROGRESS NOTES
Health  attempted outreach today. Will defer outreach and follow-up.    Last 5 Patient Entered Readings                                      Current 30 Day Average: 149/98     Recent Readings 11/20/2020 11/19/2020 10/26/2020 10/23/2020 10/23/2020    SBP (mmHg) 158 139 164 168 174    DBP (mmHg) 119 76 112 109 120    Pulse 95 140 104 104 91

## 2020-12-15 RX ORDER — HYDROCHLOROTHIAZIDE 12.5 MG/1
12.5 TABLET ORAL DAILY
Qty: 30 TABLET | Refills: 2 | Status: SHIPPED | OUTPATIENT
Start: 2020-12-15 | End: 2021-05-21 | Stop reason: SDUPTHER

## 2020-12-15 NOTE — PROGRESS NOTES
Digital Medicine: Clinician Follow-Up    Called patient to review HDMP readings.    The history is provided by the patient.   Follow-up reason(s): routine follow up.     Hypertension    Readings are trending up   Additional Follow-up details: Patient confirms charging cuff after our last encounter. He is not sure what may be contributing to elevated readings and states he is trying to get his weight under control. He is agreeable to medication adjustments for better blood pressure control.      Last 5 Patient Entered Readings                                      Current 30 Day Average: 158/108     Recent Readings 12/11/2020 12/9/2020 11/20/2020 11/19/2020 10/26/2020    SBP (mmHg) 174 160 158 139 164    DBP (mmHg) 126 112 119 76 112    Pulse 92 99 95 140 104          Depression Screening  Did not address depression screening.    Sleep Apnea Screening    Did not address sleep apnea screening.     Medication Affordability Screening  Did not address medication affordability screening.     Medication Adherence-Medication adherence was assessed.          ASSESSMENT(S)  Patients BP average is 158/108 mmHg, which is above goal. Patient's BP goal is less than or equal to 130/80.     Hypertension Plan  Hypertension Medication Change. Beging taking HCTZ 12.5mg daily. Continue taking other medications.  Labs ordered. Repeat bmp. Expected Lab Date: 12/29/2020  Provided patient education.       Addressed patient questions and patient has my contact information if needed prior to next outreach. Patient verbalizes understanding.                 Topic    Eye Exam      There are no preventive care reminders to display for this patient.      Hypertension Medications             amLODIPine (NORVASC) 10 MG tablet Take 1 tablet (10 mg total) by mouth once daily.    losartan (COZAAR) 100 MG tablet Take 1 tablet (100 mg total) by mouth once daily.

## 2020-12-18 ENCOUNTER — PATIENT OUTREACH (OUTPATIENT)
Dept: ADMINISTRATIVE | Facility: OTHER | Age: 35
End: 2020-12-18

## 2020-12-18 NOTE — PROGRESS NOTES
Health Maintenance Due   Topic Date Due    Foot Exam  05/10/1993    Eye Exam  05/10/1993    TETANUS VACCINE  05/10/2001    Pneumococcal Vaccine (Medium Risk) (1 of 1 - PPSV23) 05/10/2002    Influenza Vaccine (1) 08/01/2020     Updates were requested from care everywhere.  Chart was reviewed for overdue Proactive [facility] Encounters (IVANA) topics (CRS, Breast Cancer Screening, Eye exam)  Health Maintenance has been updated.  LINKS immunization registry triggered.  Immunizations were reconciled.

## 2020-12-28 NOTE — PROGRESS NOTES
Digital Medicine: Health  Follow-Up    The history is provided by the patient.             Reason for review: Blood pressure not at goal    Patient needs assistance troubleshooting: patient reminder needed.      Topics Covered on Call: Diet    Additional Follow-up details: Patient states he has not started taking HCTZ because he thought he was support to stop one of his other medications and was not sure which to stop. Informed patient that Paco's note indicates to start HCTZ and continue other medications. Patient verbalizes understanding.    He has not taken a BP reading recently due to being busy with working two jobs. He states he does not have 45-60 minutes to rest before readings so he has not been taking any. Clarified that we recommend resting 5-10 minutes prior to readings. Discussed timing readings at least 45-60 minutes after medications. He takes medications in the mornings and states he can begin taking readings in the evening after resting for 5 minutes.    Encouraged him to contact us with any questions or concerns. Patient requests I send clinician's phone number in text message.              Diet-Change      Dietary Improvements:Patient reports working on carb reduction - avoiding rice, bread. He switched his breakfast order to eggs on wheat with light milton. He reports getting salad or grilled chicken for lunch at [employer].            Intervention(s): carb reduction and DASH diet/sodium reduction education  Additional diet details: Encouraged continued effort to reduce sodium in diet and avoid adding salt to food.     Physical Activity-Not assessed    Medication Adherence-        Patient was unclear on how to take medication. I clarified directions. Will inform clinician of this in case further direction is needed.  Substance, Sleep, Stress-Not assessed      Additional monitoring needed.  Continue current diet/physical activity routine.  Provided patient education.  Reviewed Device  Techniques.     Addressed patient questions and patient has my contact information if needed prior to next outreach. Patient verbalizes understanding.      Explained the importance of self-monitoring and medication adherence. Encouraged the patient to communicate with their health  for lifestyle modifications to help improve or maintain a healthy lifestyle.                   Topic    Eye Exam          Last 5 Patient Entered Readings                                      Current 30 Day Average: 167/119     Recent Readings 12/11/2020 12/9/2020 11/20/2020 11/19/2020 10/26/2020    SBP (mmHg) 174 160 158 139 164    DBP (mmHg) 126 112 119 76 112    Pulse 92 99 95 140 104

## 2020-12-28 NOTE — PROGRESS NOTES
Patient informed health  that he has not started taking HCTZ because he thought he was supposed to stop taking one of his medications and could not remember which one. Patient reports he will start taking it today. Has upcoming appointment with Dr. Armstrong tomorrow. Will follow-up on medication change in 2-3 weeks.    Last 5 Patient Entered Readings                                      Current 30 Day Average: 167/119     Recent Readings 12/11/2020 12/9/2020 11/20/2020 11/19/2020 10/26/2020    SBP (mmHg) 174 160 158 139 164    DBP (mmHg) 126 112 119 76 112    Pulse 92 99 95 140 104

## 2020-12-29 ENCOUNTER — PATIENT MESSAGE (OUTPATIENT)
Dept: INTERNAL MEDICINE | Facility: CLINIC | Age: 35
End: 2020-12-29

## 2021-04-06 ENCOUNTER — TELEPHONE (OUTPATIENT)
Dept: INTERNAL MEDICINE | Facility: CLINIC | Age: 36
End: 2021-04-06

## 2021-04-16 ENCOUNTER — PATIENT MESSAGE (OUTPATIENT)
Dept: RESEARCH | Facility: HOSPITAL | Age: 36
End: 2021-04-16

## 2021-04-22 ENCOUNTER — PATIENT MESSAGE (OUTPATIENT)
Dept: INTERNAL MEDICINE | Facility: CLINIC | Age: 36
End: 2021-04-22

## 2021-05-21 ENCOUNTER — OFFICE VISIT (OUTPATIENT)
Dept: INTERNAL MEDICINE | Facility: CLINIC | Age: 36
End: 2021-05-21
Payer: COMMERCIAL

## 2021-05-21 ENCOUNTER — LAB VISIT (OUTPATIENT)
Dept: LAB | Facility: HOSPITAL | Age: 36
End: 2021-05-21
Attending: INTERNAL MEDICINE
Payer: COMMERCIAL

## 2021-05-21 VITALS
SYSTOLIC BLOOD PRESSURE: 154 MMHG | BODY MASS INDEX: 38.16 KG/M2 | WEIGHT: 306.88 LBS | DIASTOLIC BLOOD PRESSURE: 106 MMHG | HEART RATE: 88 BPM | HEIGHT: 75 IN | RESPIRATION RATE: 16 BRPM | TEMPERATURE: 97 F

## 2021-05-21 DIAGNOSIS — E55.9 VITAMIN D INSUFFICIENCY: ICD-10-CM

## 2021-05-21 DIAGNOSIS — R76.8 HCV ANTIBODY POSITIVE: ICD-10-CM

## 2021-05-21 DIAGNOSIS — E11.65 TYPE 2 DIABETES MELLITUS WITH HYPERGLYCEMIA, WITHOUT LONG-TERM CURRENT USE OF INSULIN: ICD-10-CM

## 2021-05-21 DIAGNOSIS — I10 BENIGN ESSENTIAL HYPERTENSION: ICD-10-CM

## 2021-05-21 DIAGNOSIS — E78.2 MIXED HYPERLIPIDEMIA: ICD-10-CM

## 2021-05-21 DIAGNOSIS — Z00.00 WELL ADULT EXAM: Primary | ICD-10-CM

## 2021-05-21 DIAGNOSIS — Z00.00 WELL ADULT EXAM: ICD-10-CM

## 2021-05-21 LAB
25(OH)D3+25(OH)D2 SERPL-MCNC: 27 NG/ML (ref 30–96)
ALBUMIN SERPL BCP-MCNC: 4.1 G/DL (ref 3.5–5.2)
ALP SERPL-CCNC: 99 U/L (ref 55–135)
ALT SERPL W/O P-5'-P-CCNC: 32 U/L (ref 10–44)
ANION GAP SERPL CALC-SCNC: 8 MMOL/L (ref 8–16)
ANION GAP SERPL CALC-SCNC: 8 MMOL/L (ref 8–16)
AST SERPL-CCNC: 24 U/L (ref 10–40)
BASOPHILS # BLD AUTO: 0.04 K/UL (ref 0–0.2)
BASOPHILS NFR BLD: 0.7 % (ref 0–1.9)
BILIRUB SERPL-MCNC: 0.7 MG/DL (ref 0.1–1)
BUN SERPL-MCNC: 13 MG/DL (ref 6–20)
BUN SERPL-MCNC: 13 MG/DL (ref 6–20)
CALCIUM SERPL-MCNC: 10.1 MG/DL (ref 8.7–10.5)
CALCIUM SERPL-MCNC: 10.1 MG/DL (ref 8.7–10.5)
CHLORIDE SERPL-SCNC: 101 MMOL/L (ref 95–110)
CHLORIDE SERPL-SCNC: 101 MMOL/L (ref 95–110)
CHOLEST SERPL-MCNC: 206 MG/DL (ref 120–199)
CHOLEST/HDLC SERPL: 5.2 {RATIO} (ref 2–5)
CO2 SERPL-SCNC: 27 MMOL/L (ref 23–29)
CO2 SERPL-SCNC: 27 MMOL/L (ref 23–29)
CREAT SERPL-MCNC: 1.1 MG/DL (ref 0.5–1.4)
CREAT SERPL-MCNC: 1.1 MG/DL (ref 0.5–1.4)
DIFFERENTIAL METHOD: ABNORMAL
EOSINOPHIL # BLD AUTO: 0.1 K/UL (ref 0–0.5)
EOSINOPHIL NFR BLD: 2.2 % (ref 0–8)
ERYTHROCYTE [DISTWIDTH] IN BLOOD BY AUTOMATED COUNT: 16.6 % (ref 11.5–14.5)
EST. GFR  (AFRICAN AMERICAN): >60 ML/MIN/1.73 M^2
EST. GFR  (AFRICAN AMERICAN): >60 ML/MIN/1.73 M^2
EST. GFR  (NON AFRICAN AMERICAN): >60 ML/MIN/1.73 M^2
EST. GFR  (NON AFRICAN AMERICAN): >60 ML/MIN/1.73 M^2
ESTIMATED AVG GLUCOSE: 301 MG/DL (ref 68–131)
GLUCOSE SERPL-MCNC: 227 MG/DL (ref 70–110)
GLUCOSE SERPL-MCNC: 227 MG/DL (ref 70–110)
HBA1C MFR BLD: 12.1 % (ref 4–5.6)
HCT VFR BLD AUTO: 41.5 % (ref 40–54)
HDLC SERPL-MCNC: 40 MG/DL (ref 40–75)
HDLC SERPL: 19.4 % (ref 20–50)
HGB BLD-MCNC: 13.4 G/DL (ref 14–18)
IMM GRANULOCYTES # BLD AUTO: 0.02 K/UL (ref 0–0.04)
IMM GRANULOCYTES NFR BLD AUTO: 0.3 % (ref 0–0.5)
LDLC SERPL CALC-MCNC: 89 MG/DL (ref 63–159)
LYMPHOCYTES # BLD AUTO: 2 K/UL (ref 1–4.8)
LYMPHOCYTES NFR BLD: 35.1 % (ref 18–48)
MCH RBC QN AUTO: 24.4 PG (ref 27–31)
MCHC RBC AUTO-ENTMCNC: 32.3 G/DL (ref 32–36)
MCV RBC AUTO: 76 FL (ref 82–98)
MONOCYTES # BLD AUTO: 0.6 K/UL (ref 0.3–1)
MONOCYTES NFR BLD: 10.7 % (ref 4–15)
NEUTROPHILS # BLD AUTO: 3 K/UL (ref 1.8–7.7)
NEUTROPHILS NFR BLD: 51 % (ref 38–73)
NONHDLC SERPL-MCNC: 166 MG/DL
NRBC BLD-RTO: 0 /100 WBC
PLATELET # BLD AUTO: 264 K/UL (ref 150–450)
PMV BLD AUTO: 10 FL (ref 9.2–12.9)
POTASSIUM SERPL-SCNC: 4.3 MMOL/L (ref 3.5–5.1)
POTASSIUM SERPL-SCNC: 4.3 MMOL/L (ref 3.5–5.1)
PROT SERPL-MCNC: 8 G/DL (ref 6–8.4)
RBC # BLD AUTO: 5.5 M/UL (ref 4.6–6.2)
SODIUM SERPL-SCNC: 136 MMOL/L (ref 136–145)
SODIUM SERPL-SCNC: 136 MMOL/L (ref 136–145)
T4 FREE SERPL-MCNC: 1.04 NG/DL (ref 0.71–1.51)
TRIGL SERPL-MCNC: 385 MG/DL (ref 30–150)
TSH SERPL DL<=0.005 MIU/L-ACNC: 1.68 UIU/ML (ref 0.4–4)
WBC # BLD AUTO: 5.82 K/UL (ref 3.9–12.7)

## 2021-05-21 PROCEDURE — 3008F BODY MASS INDEX DOCD: CPT | Mod: CPTII,S$GLB,, | Performed by: FAMILY MEDICINE

## 2021-05-21 PROCEDURE — 1126F AMNT PAIN NOTED NONE PRSNT: CPT | Mod: S$GLB,,, | Performed by: FAMILY MEDICINE

## 2021-05-21 PROCEDURE — 99999 PR PBB SHADOW E&M-EST. PATIENT-LVL V: ICD-10-PCS | Mod: PBBFAC,,, | Performed by: FAMILY MEDICINE

## 2021-05-21 PROCEDURE — 99999 PR PBB SHADOW E&M-EST. PATIENT-LVL V: CPT | Mod: PBBFAC,,, | Performed by: FAMILY MEDICINE

## 2021-05-21 PROCEDURE — 85025 COMPLETE CBC W/AUTO DIFF WBC: CPT | Performed by: FAMILY MEDICINE

## 2021-05-21 PROCEDURE — 82306 VITAMIN D 25 HYDROXY: CPT | Performed by: FAMILY MEDICINE

## 2021-05-21 PROCEDURE — 87522 HEPATITIS C REVRS TRNSCRPJ: CPT | Performed by: FAMILY MEDICINE

## 2021-05-21 PROCEDURE — 80053 COMPREHEN METABOLIC PANEL: CPT | Performed by: FAMILY MEDICINE

## 2021-05-21 PROCEDURE — 84443 ASSAY THYROID STIM HORMONE: CPT | Performed by: FAMILY MEDICINE

## 2021-05-21 PROCEDURE — 99395 PREV VISIT EST AGE 18-39: CPT | Mod: S$GLB,,, | Performed by: FAMILY MEDICINE

## 2021-05-21 PROCEDURE — 99395 PR PREVENTIVE VISIT,EST,18-39: ICD-10-PCS | Mod: S$GLB,,, | Performed by: FAMILY MEDICINE

## 2021-05-21 PROCEDURE — 36415 COLL VENOUS BLD VENIPUNCTURE: CPT | Mod: PO | Performed by: FAMILY MEDICINE

## 2021-05-21 PROCEDURE — 80061 LIPID PANEL: CPT | Performed by: FAMILY MEDICINE

## 2021-05-21 PROCEDURE — 1126F PR PAIN SEVERITY QUANTIFIED, NO PAIN PRESENT: ICD-10-PCS | Mod: S$GLB,,, | Performed by: FAMILY MEDICINE

## 2021-05-21 PROCEDURE — 83036 HEMOGLOBIN GLYCOSYLATED A1C: CPT | Performed by: FAMILY MEDICINE

## 2021-05-21 PROCEDURE — 3008F PR BODY MASS INDEX (BMI) DOCUMENTED: ICD-10-PCS | Mod: CPTII,S$GLB,, | Performed by: FAMILY MEDICINE

## 2021-05-21 PROCEDURE — 84439 ASSAY OF FREE THYROXINE: CPT | Performed by: FAMILY MEDICINE

## 2021-05-21 RX ORDER — LOSARTAN POTASSIUM 100 MG/1
100 TABLET ORAL DAILY
Qty: 90 TABLET | Refills: 3 | Status: SHIPPED | OUTPATIENT
Start: 2021-05-21 | End: 2022-08-17

## 2021-05-21 RX ORDER — MULTIVITAMIN
1 TABLET ORAL DAILY
COMMUNITY
End: 2023-05-03

## 2021-05-21 RX ORDER — ATORVASTATIN CALCIUM 10 MG/1
10 TABLET, FILM COATED ORAL DAILY
Qty: 90 TABLET | Refills: 3 | Status: SHIPPED | OUTPATIENT
Start: 2021-05-21 | End: 2022-08-17 | Stop reason: SDUPTHER

## 2021-05-21 RX ORDER — HYDROCHLOROTHIAZIDE 12.5 MG/1
12.5 TABLET ORAL DAILY
Qty: 90 TABLET | Refills: 3 | Status: SHIPPED | OUTPATIENT
Start: 2021-05-21 | End: 2022-08-17

## 2021-05-21 RX ORDER — METFORMIN HYDROCHLORIDE 1000 MG/1
1000 TABLET ORAL 2 TIMES DAILY WITH MEALS
Qty: 180 TABLET | Refills: 3 | Status: SHIPPED | OUTPATIENT
Start: 2021-05-21 | End: 2022-08-17 | Stop reason: SDUPTHER

## 2021-05-21 RX ORDER — AMLODIPINE BESYLATE 10 MG/1
10 TABLET ORAL DAILY
Qty: 90 TABLET | Refills: 3 | Status: SHIPPED | OUTPATIENT
Start: 2021-05-21 | End: 2022-08-17

## 2021-05-22 LAB — HEPATITIS C VIRUS (HCV) RNA DETECTION/QUANTIFICATION RT-PCR: NORMAL IU/ML

## 2021-05-24 LAB
HCV GENTYP SERPL NAA+PROBE: NORMAL
HCV RNA SERPL NAA+PROBE-LOG IU: <1.08 LOG (10) IU/ML
HCV RNA SERPL QL NAA+PROBE: NOT DETECTED
HCV RNA SPEC NAA+PROBE-ACNC: <12 IU/ML

## 2021-06-16 ENCOUNTER — PATIENT MESSAGE (OUTPATIENT)
Dept: ADMINISTRATIVE | Facility: HOSPITAL | Age: 36
End: 2021-06-16

## 2021-06-16 ENCOUNTER — TELEPHONE (OUTPATIENT)
Dept: INTERNAL MEDICINE | Facility: CLINIC | Age: 36
End: 2021-06-16

## 2021-06-16 ENCOUNTER — PATIENT OUTREACH (OUTPATIENT)
Dept: ADMINISTRATIVE | Facility: HOSPITAL | Age: 36
End: 2021-06-16

## 2021-06-17 ENCOUNTER — OFFICE VISIT (OUTPATIENT)
Dept: INTERNAL MEDICINE | Facility: CLINIC | Age: 36
End: 2021-06-17
Payer: COMMERCIAL

## 2021-06-17 ENCOUNTER — TELEPHONE (OUTPATIENT)
Dept: PHARMACY | Facility: CLINIC | Age: 36
End: 2021-06-17

## 2021-06-17 VITALS
BODY MASS INDEX: 37.58 KG/M2 | HEIGHT: 75 IN | HEART RATE: 96 BPM | OXYGEN SATURATION: 99 % | DIASTOLIC BLOOD PRESSURE: 108 MMHG | SYSTOLIC BLOOD PRESSURE: 148 MMHG | WEIGHT: 302.25 LBS | RESPIRATION RATE: 18 BRPM | TEMPERATURE: 98 F

## 2021-06-17 DIAGNOSIS — E11.59 HYPERTENSION ASSOCIATED WITH DIABETES: ICD-10-CM

## 2021-06-17 DIAGNOSIS — E78.5 HYPERLIPIDEMIA ASSOCIATED WITH TYPE 2 DIABETES MELLITUS: ICD-10-CM

## 2021-06-17 DIAGNOSIS — E11.69 HYPERLIPIDEMIA ASSOCIATED WITH TYPE 2 DIABETES MELLITUS: ICD-10-CM

## 2021-06-17 DIAGNOSIS — I15.2 HYPERTENSION ASSOCIATED WITH DIABETES: ICD-10-CM

## 2021-06-17 DIAGNOSIS — E11.65 TYPE 2 DIABETES MELLITUS WITH HYPERGLYCEMIA, WITHOUT LONG-TERM CURRENT USE OF INSULIN: Primary | ICD-10-CM

## 2021-06-17 PROBLEM — I10 BENIGN ESSENTIAL HYPERTENSION: Status: RESOLVED | Noted: 2020-10-21 | Resolved: 2021-06-17

## 2021-06-17 PROBLEM — E78.2 MIXED HYPERLIPIDEMIA: Status: RESOLVED | Noted: 2020-10-24 | Resolved: 2021-06-17

## 2021-06-17 PROCEDURE — 3008F PR BODY MASS INDEX (BMI) DOCUMENTED: ICD-10-PCS | Mod: CPTII,S$GLB,, | Performed by: NURSE PRACTITIONER

## 2021-06-17 PROCEDURE — 1126F AMNT PAIN NOTED NONE PRSNT: CPT | Mod: S$GLB,,, | Performed by: NURSE PRACTITIONER

## 2021-06-17 PROCEDURE — 99999 PR PBB SHADOW E&M-EST. PATIENT-LVL V: CPT | Mod: PBBFAC,,, | Performed by: NURSE PRACTITIONER

## 2021-06-17 PROCEDURE — 99215 PR OFFICE/OUTPT VISIT, EST, LEVL V, 40-54 MIN: ICD-10-PCS | Mod: S$GLB,,, | Performed by: NURSE PRACTITIONER

## 2021-06-17 PROCEDURE — 3046F HEMOGLOBIN A1C LEVEL >9.0%: CPT | Mod: CPTII,S$GLB,, | Performed by: NURSE PRACTITIONER

## 2021-06-17 PROCEDURE — 99999 PR PBB SHADOW E&M-EST. PATIENT-LVL V: ICD-10-PCS | Mod: PBBFAC,,, | Performed by: NURSE PRACTITIONER

## 2021-06-17 PROCEDURE — 1126F PR PAIN SEVERITY QUANTIFIED, NO PAIN PRESENT: ICD-10-PCS | Mod: S$GLB,,, | Performed by: NURSE PRACTITIONER

## 2021-06-17 PROCEDURE — 3008F BODY MASS INDEX DOCD: CPT | Mod: CPTII,S$GLB,, | Performed by: NURSE PRACTITIONER

## 2021-06-17 PROCEDURE — 99215 OFFICE O/P EST HI 40 MIN: CPT | Mod: S$GLB,,, | Performed by: NURSE PRACTITIONER

## 2021-06-17 PROCEDURE — 3046F PR MOST RECENT HEMOGLOBIN A1C LEVEL > 9.0%: ICD-10-PCS | Mod: CPTII,S$GLB,, | Performed by: NURSE PRACTITIONER

## 2021-06-17 RX ORDER — DAPAGLIFLOZIN 5 MG/1
5 TABLET, FILM COATED ORAL DAILY
Qty: 30 TABLET | Refills: 6 | Status: SHIPPED | OUTPATIENT
Start: 2021-06-17 | End: 2022-08-17

## 2021-06-17 RX ORDER — DULAGLUTIDE 0.75 MG/.5ML
0.75 INJECTION, SOLUTION SUBCUTANEOUS WEEKLY
Qty: 4 PEN | Refills: 3 | Status: SHIPPED | OUTPATIENT
Start: 2021-06-17 | End: 2021-09-27 | Stop reason: SDUPTHER

## 2021-06-24 ENCOUNTER — PATIENT OUTREACH (OUTPATIENT)
Dept: ADMINISTRATIVE | Facility: OTHER | Age: 36
End: 2021-06-24

## 2021-06-25 ENCOUNTER — CLINICAL SUPPORT (OUTPATIENT)
Dept: DIABETES | Facility: CLINIC | Age: 36
End: 2021-06-25
Payer: COMMERCIAL

## 2021-06-25 DIAGNOSIS — E11.65 TYPE 2 DIABETES MELLITUS WITH HYPERGLYCEMIA, WITHOUT LONG-TERM CURRENT USE OF INSULIN: ICD-10-CM

## 2021-06-25 PROCEDURE — G0108 DIAB MANAGE TRN  PER INDIV: HCPCS | Mod: S$GLB,,, | Performed by: DIETITIAN, REGISTERED

## 2021-06-25 PROCEDURE — G0108 PR DIAB MANAGE TRN  PER INDIV: ICD-10-PCS | Mod: S$GLB,,, | Performed by: DIETITIAN, REGISTERED

## 2021-09-27 DIAGNOSIS — E78.5 HYPERLIPIDEMIA ASSOCIATED WITH TYPE 2 DIABETES MELLITUS: ICD-10-CM

## 2021-09-27 DIAGNOSIS — E11.65 TYPE 2 DIABETES MELLITUS WITH HYPERGLYCEMIA, WITHOUT LONG-TERM CURRENT USE OF INSULIN: Primary | ICD-10-CM

## 2021-09-27 DIAGNOSIS — E11.69 HYPERLIPIDEMIA ASSOCIATED WITH TYPE 2 DIABETES MELLITUS: ICD-10-CM

## 2021-09-27 DIAGNOSIS — E11.59 HYPERTENSION ASSOCIATED WITH DIABETES: ICD-10-CM

## 2021-09-27 DIAGNOSIS — I15.2 HYPERTENSION ASSOCIATED WITH DIABETES: ICD-10-CM

## 2021-09-27 RX ORDER — DULAGLUTIDE 0.75 MG/.5ML
0.75 INJECTION, SOLUTION SUBCUTANEOUS WEEKLY
Qty: 4 PEN | Refills: 1 | Status: SHIPPED | OUTPATIENT
Start: 2021-09-27 | End: 2021-12-06 | Stop reason: SDUPTHER

## 2021-11-02 ENCOUNTER — PATIENT OUTREACH (OUTPATIENT)
Dept: ADMINISTRATIVE | Facility: HOSPITAL | Age: 36
End: 2021-11-02
Payer: COMMERCIAL

## 2021-11-19 ENCOUNTER — HOSPITAL ENCOUNTER (EMERGENCY)
Facility: HOSPITAL | Age: 36
Discharge: HOME | End: 2021-11-19
Attending: EMERGENCY MEDICINE
Payer: COMMERCIAL

## 2021-11-19 VITALS
BODY MASS INDEX: 37.3 KG/M2 | HEART RATE: 95 BPM | TEMPERATURE: 98 F | RESPIRATION RATE: 14 BRPM | HEIGHT: 75 IN | WEIGHT: 300 LBS | OXYGEN SATURATION: 97 % | DIASTOLIC BLOOD PRESSURE: 125 MMHG | SYSTOLIC BLOOD PRESSURE: 204 MMHG

## 2021-11-19 DIAGNOSIS — M54.6 ACUTE BILATERAL THORACIC BACK PAIN: Primary | ICD-10-CM

## 2021-11-19 PROCEDURE — 63600175 PHARM REV CODE 636 W HCPCS: Performed by: EMERGENCY MEDICINE

## 2021-11-19 PROCEDURE — 25000003 PHARM REV CODE 250: Performed by: EMERGENCY MEDICINE

## 2021-11-19 PROCEDURE — 99284 EMERGENCY DEPT VISIT MOD MDM: CPT | Mod: 25

## 2021-11-19 PROCEDURE — 96372 THER/PROPH/DIAG INJ SC/IM: CPT

## 2021-11-19 RX ORDER — AMLODIPINE BESYLATE 5 MG/1
10 TABLET ORAL
Status: COMPLETED | OUTPATIENT
Start: 2021-11-19 | End: 2021-11-19

## 2021-11-19 RX ORDER — KETOROLAC TROMETHAMINE 30 MG/ML
30 INJECTION, SOLUTION INTRAMUSCULAR; INTRAVENOUS
Status: COMPLETED | OUTPATIENT
Start: 2021-11-19 | End: 2021-11-19

## 2021-11-19 RX ORDER — HYDROCODONE BITARTRATE AND ACETAMINOPHEN 5; 325 MG/1; MG/1
1 TABLET ORAL EVERY 6 HOURS PRN
Qty: 6 TABLET | Refills: 0 | Status: SHIPPED | OUTPATIENT
Start: 2021-11-19 | End: 2022-08-17

## 2021-11-19 RX ORDER — IBUPROFEN 600 MG/1
600 TABLET ORAL EVERY 6 HOURS PRN
Qty: 20 TABLET | Refills: 0 | Status: SHIPPED | OUTPATIENT
Start: 2021-11-19 | End: 2023-01-11

## 2021-11-19 RX ORDER — METHOCARBAMOL 750 MG/1
1500 TABLET, FILM COATED ORAL 3 TIMES DAILY
Qty: 30 TABLET | Refills: 0 | Status: SHIPPED | OUTPATIENT
Start: 2021-11-19 | End: 2021-11-24

## 2021-11-19 RX ADMIN — AMLODIPINE BESYLATE 10 MG: 5 TABLET ORAL at 08:11

## 2021-11-19 RX ADMIN — KETOROLAC TROMETHAMINE 30 MG: 30 INJECTION, SOLUTION INTRAMUSCULAR at 08:11

## 2021-12-06 RX ORDER — DULAGLUTIDE 0.75 MG/.5ML
0.75 INJECTION, SOLUTION SUBCUTANEOUS WEEKLY
Qty: 4 PEN | Refills: 1 | Status: SHIPPED | OUTPATIENT
Start: 2021-12-06 | End: 2022-03-16

## 2022-01-14 ENCOUNTER — TELEPHONE (OUTPATIENT)
Dept: PHARMACY | Facility: CLINIC | Age: 37
End: 2022-01-14
Payer: COMMERCIAL

## 2022-01-15 ENCOUNTER — PATIENT MESSAGE (OUTPATIENT)
Dept: INTERNAL MEDICINE | Facility: CLINIC | Age: 37
End: 2022-01-15
Payer: COMMERCIAL

## 2022-01-25 ENCOUNTER — PATIENT MESSAGE (OUTPATIENT)
Dept: ADMINISTRATIVE | Facility: HOSPITAL | Age: 37
End: 2022-01-25
Payer: COMMERCIAL

## 2022-02-19 ENCOUNTER — PATIENT MESSAGE (OUTPATIENT)
Dept: ADMINISTRATIVE | Facility: HOSPITAL | Age: 37
End: 2022-02-19
Payer: COMMERCIAL

## 2022-03-18 NOTE — TELEPHONE ENCOUNTER
Care Due:                  Date            Visit Type   Department     Provider  --------------------------------------------------------------------------------                                NP -                              PRIMARY      MET INTERNAL  Last Visit: 05-      CARE ([facility])   MEDICINE       James Pitts  Next Visit: None Scheduled  None         None Found                                                            Last  Test          Frequency    Reason                     Performed    Due Date  --------------------------------------------------------------------------------    Office Visit  12 months..  amLODIPine, atorvastatin,   05- 05-                             hydroCHLOROthiazide,                             losartan, metFORMIN......    CMP.........  12 months..  atorvastatin,              05- 05-                             hydroCHLOROthiazide,                             losartan, metFORMIN......    HBA1C.......  6 months...  metFORMIN................  05- 11-    Lipid Panel.  12 months..  atorvastatin.............  05- 05-    Powered by [employer]. Reference number: [id number].   3/18/2022 4:46:53 PM CDT

## 2022-03-21 RX ORDER — DULAGLUTIDE 0.75 MG/.5ML
0.75 INJECTION, SOLUTION SUBCUTANEOUS WEEKLY
Qty: 4 PEN | Refills: 1 | OUTPATIENT
Start: 2022-03-21 | End: 2022-06-19

## 2022-03-21 RX ORDER — DULAGLUTIDE 1.5 MG/.5ML
1.5 INJECTION, SOLUTION SUBCUTANEOUS
Qty: 4 PEN | Refills: 3 | Status: SHIPPED | OUTPATIENT
Start: 2022-03-21 | End: 2022-07-22 | Stop reason: SDUPTHER

## 2022-03-21 NOTE — TELEPHONE ENCOUNTER
Will you call patient and arrange for follow up visit with me?   I had a refill request from him for trulicity -   I authorized it and increased dose from 0.75 to 1.5 - looks like he needs labs done too.   Last seen 2021.   Thanks,  jose

## 2022-04-14 DIAGNOSIS — E11.9 TYPE 2 DIABETES MELLITUS WITHOUT COMPLICATION: ICD-10-CM

## 2022-05-21 ENCOUNTER — PATIENT MESSAGE (OUTPATIENT)
Dept: ADMINISTRATIVE | Facility: HOSPITAL | Age: 37
End: 2022-05-21
Payer: COMMERCIAL

## 2022-07-05 ENCOUNTER — PATIENT OUTREACH (OUTPATIENT)
Dept: ADMINISTRATIVE | Facility: HOSPITAL | Age: 37
End: 2022-07-05
Payer: COMMERCIAL

## 2022-07-05 ENCOUNTER — PATIENT MESSAGE (OUTPATIENT)
Dept: ADMINISTRATIVE | Facility: HOSPITAL | Age: 37
End: 2022-07-05
Payer: COMMERCIAL

## 2022-07-22 RX ORDER — DULAGLUTIDE 1.5 MG/.5ML
1.5 INJECTION, SOLUTION SUBCUTANEOUS
Qty: 4 PEN | Refills: 0 | Status: SHIPPED | OUTPATIENT
Start: 2022-07-22 | End: 2022-08-17

## 2022-07-22 NOTE — TELEPHONE ENCOUNTER
Can you call patient? Let him know that I sent in an rx for trulicity - it's a 1 month worth, he needs an office visit and labs for recheck within that time? Can you arrange.   He was last seen may/june 2021 -     Thanks,  jose

## 2022-07-22 NOTE — TELEPHONE ENCOUNTER
Care Due:                  Date            Visit Type   Department     Provider  --------------------------------------------------------------------------------                                NP -                              PRIMARY      MET INTERNAL  Last Visit: 05-      CARE ([facility])   MEDICINE       James Pitts  Next Visit: None Scheduled  None         None Found                                                            Last  Test          Frequency    Reason                     Performed    Due Date  --------------------------------------------------------------------------------    Office Visit  12 months..  amLODIPine, losartan,      05- 05-                             metFORMIN................    CMP.........  12 months..  losartan, metFORMIN......  05- 05-    HBA1C.......  6 months...  metFORMIN................  05- 11-    Health [facility] Embedded Care Gaps. Reference number: [id number]. 7/22/2022   9:00:11 AM CDT

## 2022-08-02 ENCOUNTER — TELEPHONE (OUTPATIENT)
Dept: INTERNAL MEDICINE | Facility: CLINIC | Age: 37
End: 2022-08-02
Payer: COMMERCIAL

## 2022-08-02 DIAGNOSIS — E11.59 HYPERTENSION ASSOCIATED WITH DIABETES: ICD-10-CM

## 2022-08-02 DIAGNOSIS — I15.2 HYPERTENSION ASSOCIATED WITH DIABETES: ICD-10-CM

## 2022-08-02 DIAGNOSIS — E11.69 HYPERLIPIDEMIA ASSOCIATED WITH TYPE 2 DIABETES MELLITUS: ICD-10-CM

## 2022-08-02 DIAGNOSIS — E11.65 TYPE 2 DIABETES MELLITUS WITH HYPERGLYCEMIA, WITHOUT LONG-TERM CURRENT USE OF INSULIN: Primary | ICD-10-CM

## 2022-08-02 DIAGNOSIS — E78.5 HYPERLIPIDEMIA ASSOCIATED WITH TYPE 2 DIABETES MELLITUS: ICD-10-CM

## 2022-08-02 NOTE — TELEPHONE ENCOUNTER
----- Message from Vahe Gongora sent at 8/2/2022 10:58 AM CDT -----  Contact: pt [phone]  Patient is requesting a F/U appointment for the month of August. Please call and advise.    Thank you and have a great day.

## 2022-08-02 NOTE — TELEPHONE ENCOUNTER
Pt requesting Annual lab orders    Caller is requesting to schedule their Lab appointment prior to annual appointment.    Name of Caller: Josh Julio    Preferred Date and Time of Labs:08/12/22 around 10 am    Date of Annual Physical Appointment:08/17/22    Where would they like the lab performed?[facility]    Would the patient rather a call back or a response via [facility]? phone    Best Call Back Number:[phone]          .

## 2022-08-03 ENCOUNTER — TELEPHONE (OUTPATIENT)
Dept: INTERNAL MEDICINE | Facility: CLINIC | Age: 37
End: 2022-08-03
Payer: COMMERCIAL

## 2022-08-03 NOTE — TELEPHONE ENCOUNTER
----- Message from Karla Busby sent at 8/3/2022  9:28 AM CDT -----  Contact: [phone] Patient  Patient is returning a phone call.  Who left a message for the patient: Alisa Rajput  Does patient know what this is regarding:  ?  Would you like a call back, or a response through your [facility] portal?:   Call Back  Comments:  Pt stated please leave a message if he doesn't answer he is at work.       Pt informed of annual labs being scheduled on 8/12/22 as requested.

## 2022-08-08 ENCOUNTER — PATIENT MESSAGE (OUTPATIENT)
Dept: ADMINISTRATIVE | Facility: HOSPITAL | Age: 37
End: 2022-08-08
Payer: COMMERCIAL

## 2022-08-08 ENCOUNTER — PATIENT OUTREACH (OUTPATIENT)
Dept: ADMINISTRATIVE | Facility: HOSPITAL | Age: 37
End: 2022-08-08
Payer: COMMERCIAL

## 2022-08-12 ENCOUNTER — LAB VISIT (OUTPATIENT)
Dept: LAB | Facility: HOSPITAL | Age: 37
End: 2022-08-12
Attending: NURSE PRACTITIONER
Payer: COMMERCIAL

## 2022-08-12 DIAGNOSIS — E11.69 HYPERLIPIDEMIA ASSOCIATED WITH TYPE 2 DIABETES MELLITUS: ICD-10-CM

## 2022-08-12 DIAGNOSIS — I15.2 HYPERTENSION ASSOCIATED WITH DIABETES: ICD-10-CM

## 2022-08-12 DIAGNOSIS — E11.59 HYPERTENSION ASSOCIATED WITH DIABETES: ICD-10-CM

## 2022-08-12 DIAGNOSIS — E11.65 TYPE 2 DIABETES MELLITUS WITH HYPERGLYCEMIA, WITHOUT LONG-TERM CURRENT USE OF INSULIN: ICD-10-CM

## 2022-08-12 DIAGNOSIS — E78.5 HYPERLIPIDEMIA ASSOCIATED WITH TYPE 2 DIABETES MELLITUS: ICD-10-CM

## 2022-08-12 LAB
ALBUMIN SERPL BCP-MCNC: 4.3 G/DL (ref 3.5–5.2)
ALP SERPL-CCNC: 101 U/L (ref 55–135)
ALT SERPL W/O P-5'-P-CCNC: 31 U/L (ref 10–44)
ANION GAP SERPL CALC-SCNC: 11 MMOL/L (ref 8–16)
AST SERPL-CCNC: 24 U/L (ref 10–40)
BASOPHILS # BLD AUTO: 0.04 K/UL (ref 0–0.2)
BASOPHILS NFR BLD: 0.7 % (ref 0–1.9)
BILIRUB SERPL-MCNC: 1 MG/DL (ref 0.1–1)
BUN SERPL-MCNC: 11 MG/DL (ref 6–20)
CALCIUM SERPL-MCNC: 10.1 MG/DL (ref 8.7–10.5)
CHLORIDE SERPL-SCNC: 102 MMOL/L (ref 95–110)
CHOLEST SERPL-MCNC: 221 MG/DL (ref 120–199)
CHOLEST/HDLC SERPL: 4.8 {RATIO} (ref 2–5)
CO2 SERPL-SCNC: 26 MMOL/L (ref 23–29)
CREAT SERPL-MCNC: 1 MG/DL (ref 0.5–1.4)
DIFFERENTIAL METHOD: ABNORMAL
EOSINOPHIL # BLD AUTO: 0.2 K/UL (ref 0–0.5)
EOSINOPHIL NFR BLD: 3.3 % (ref 0–8)
ERYTHROCYTE [DISTWIDTH] IN BLOOD BY AUTOMATED COUNT: 15 % (ref 11.5–14.5)
EST. GFR  (NO RACE VARIABLE): >60 ML/MIN/1.73 M^2
ESTIMATED AVG GLUCOSE: 286 MG/DL (ref 68–131)
GLUCOSE SERPL-MCNC: 192 MG/DL (ref 70–110)
HBA1C MFR BLD: 11.6 % (ref 4–5.6)
HCT VFR BLD AUTO: 42.5 % (ref 40–54)
HDLC SERPL-MCNC: 46 MG/DL (ref 40–75)
HDLC SERPL: 20.8 % (ref 20–50)
HGB BLD-MCNC: 14 G/DL (ref 14–18)
IMM GRANULOCYTES # BLD AUTO: 0.01 K/UL (ref 0–0.04)
IMM GRANULOCYTES NFR BLD AUTO: 0.2 % (ref 0–0.5)
LDLC SERPL CALC-MCNC: 118.4 MG/DL (ref 63–159)
LYMPHOCYTES # BLD AUTO: 2.3 K/UL (ref 1–4.8)
LYMPHOCYTES NFR BLD: 42 % (ref 18–48)
MCH RBC QN AUTO: 26.2 PG (ref 27–31)
MCHC RBC AUTO-ENTMCNC: 32.9 G/DL (ref 32–36)
MCV RBC AUTO: 80 FL (ref 82–98)
MONOCYTES # BLD AUTO: 0.5 K/UL (ref 0.3–1)
MONOCYTES NFR BLD: 9.3 % (ref 4–15)
NEUTROPHILS # BLD AUTO: 2.4 K/UL (ref 1.8–7.7)
NEUTROPHILS NFR BLD: 44.5 % (ref 38–73)
NONHDLC SERPL-MCNC: 175 MG/DL
NRBC BLD-RTO: 0 /100 WBC
PLATELET # BLD AUTO: 259 K/UL (ref 150–450)
PMV BLD AUTO: 10.2 FL (ref 9.2–12.9)
POTASSIUM SERPL-SCNC: 4.7 MMOL/L (ref 3.5–5.1)
PROT SERPL-MCNC: 7.5 G/DL (ref 6–8.4)
RBC # BLD AUTO: 5.34 M/UL (ref 4.6–6.2)
SODIUM SERPL-SCNC: 139 MMOL/L (ref 136–145)
TRIGL SERPL-MCNC: 283 MG/DL (ref 30–150)
TSH SERPL DL<=0.005 MIU/L-ACNC: 1.68 UIU/ML (ref 0.4–4)
WBC # BLD AUTO: 5.4 K/UL (ref 3.9–12.7)

## 2022-08-12 PROCEDURE — 84443 ASSAY THYROID STIM HORMONE: CPT | Performed by: NURSE PRACTITIONER

## 2022-08-12 PROCEDURE — 85025 COMPLETE CBC W/AUTO DIFF WBC: CPT | Performed by: NURSE PRACTITIONER

## 2022-08-12 PROCEDURE — 83036 HEMOGLOBIN GLYCOSYLATED A1C: CPT | Performed by: NURSE PRACTITIONER

## 2022-08-12 PROCEDURE — 36415 COLL VENOUS BLD VENIPUNCTURE: CPT | Mod: PO | Performed by: NURSE PRACTITIONER

## 2022-08-12 PROCEDURE — 80061 LIPID PANEL: CPT | Performed by: NURSE PRACTITIONER

## 2022-08-12 PROCEDURE — 80053 COMPREHEN METABOLIC PANEL: CPT | Performed by: NURSE PRACTITIONER

## 2022-08-17 ENCOUNTER — OFFICE VISIT (OUTPATIENT)
Dept: INTERNAL MEDICINE | Facility: CLINIC | Age: 37
End: 2022-08-17
Payer: COMMERCIAL

## 2022-08-17 VITALS
OXYGEN SATURATION: 96 % | SYSTOLIC BLOOD PRESSURE: 220 MMHG | BODY MASS INDEX: 38.24 KG/M2 | HEART RATE: 95 BPM | DIASTOLIC BLOOD PRESSURE: 130 MMHG | WEIGHT: 307.56 LBS | HEIGHT: 75 IN | RESPIRATION RATE: 20 BRPM | TEMPERATURE: 98 F

## 2022-08-17 DIAGNOSIS — I15.2 HYPERTENSION ASSOCIATED WITH DIABETES: ICD-10-CM

## 2022-08-17 DIAGNOSIS — E11.69 HYPERLIPIDEMIA ASSOCIATED WITH TYPE 2 DIABETES MELLITUS: ICD-10-CM

## 2022-08-17 DIAGNOSIS — E78.5 HYPERLIPIDEMIA ASSOCIATED WITH TYPE 2 DIABETES MELLITUS: ICD-10-CM

## 2022-08-17 DIAGNOSIS — Z00.00 ENCOUNTER FOR ANNUAL HEALTH EXAMINATION: Primary | ICD-10-CM

## 2022-08-17 DIAGNOSIS — E11.59 HYPERTENSION ASSOCIATED WITH DIABETES: ICD-10-CM

## 2022-08-17 DIAGNOSIS — E11.65 TYPE 2 DIABETES MELLITUS WITH HYPERGLYCEMIA, WITHOUT LONG-TERM CURRENT USE OF INSULIN: Primary | ICD-10-CM

## 2022-08-17 DIAGNOSIS — E11.65 TYPE 2 DIABETES MELLITUS WITH HYPERGLYCEMIA, WITHOUT LONG-TERM CURRENT USE OF INSULIN: ICD-10-CM

## 2022-08-17 DIAGNOSIS — E66.01 CLASS 2 SEVERE OBESITY WITH SERIOUS COMORBIDITY AND BODY MASS INDEX (BMI) OF 38.0 TO 38.9 IN ADULT, UNSPECIFIED OBESITY TYPE: ICD-10-CM

## 2022-08-17 PROBLEM — R76.8 HCV ANTIBODY POSITIVE: Status: RESOLVED | Noted: 2020-10-26 | Resolved: 2022-08-17

## 2022-08-17 PROCEDURE — 3080F PR MOST RECENT DIASTOLIC BLOOD PRESSURE >= 90 MM HG: ICD-10-PCS | Mod: CPTII,S$GLB,, | Performed by: NURSE PRACTITIONER

## 2022-08-17 PROCEDURE — 1159F PR MEDICATION LIST DOCUMENTED IN MEDICAL RECORD: ICD-10-PCS | Mod: CPTII,95,, | Performed by: NURSE PRACTITIONER

## 2022-08-17 PROCEDURE — 1160F PR REVIEW ALL MEDS BY PRESCRIBER/CLIN PHARMACIST DOCUMENTED: ICD-10-PCS | Mod: CPTII,95,, | Performed by: NURSE PRACTITIONER

## 2022-08-17 PROCEDURE — 99215 OFFICE O/P EST HI 40 MIN: CPT | Mod: 95,,, | Performed by: NURSE PRACTITIONER

## 2022-08-17 PROCEDURE — 99395 PR PREVENTIVE VISIT,EST,18-39: ICD-10-PCS | Mod: S$GLB,,, | Performed by: NURSE PRACTITIONER

## 2022-08-17 PROCEDURE — 3080F DIAST BP >= 90 MM HG: CPT | Mod: CPTII,S$GLB,, | Performed by: NURSE PRACTITIONER

## 2022-08-17 PROCEDURE — 3046F HEMOGLOBIN A1C LEVEL >9.0%: CPT | Mod: CPTII,95,, | Performed by: NURSE PRACTITIONER

## 2022-08-17 PROCEDURE — 3008F PR BODY MASS INDEX (BMI) DOCUMENTED: ICD-10-PCS | Mod: CPTII,S$GLB,, | Performed by: NURSE PRACTITIONER

## 2022-08-17 PROCEDURE — 99999 PR PBB SHADOW E&M-EST. PATIENT-LVL V: ICD-10-PCS | Mod: PBBFAC,,, | Performed by: NURSE PRACTITIONER

## 2022-08-17 PROCEDURE — 1160F PR REVIEW ALL MEDS BY PRESCRIBER/CLIN PHARMACIST DOCUMENTED: ICD-10-PCS | Mod: CPTII,S$GLB,, | Performed by: NURSE PRACTITIONER

## 2022-08-17 PROCEDURE — 1159F MED LIST DOCD IN RCRD: CPT | Mod: CPTII,95,, | Performed by: NURSE PRACTITIONER

## 2022-08-17 PROCEDURE — 99999 PR PBB SHADOW E&M-EST. PATIENT-LVL V: CPT | Mod: PBBFAC,,, | Performed by: NURSE PRACTITIONER

## 2022-08-17 PROCEDURE — 99395 PREV VISIT EST AGE 18-39: CPT | Mod: S$GLB,,, | Performed by: NURSE PRACTITIONER

## 2022-08-17 PROCEDURE — 1160F RVW MEDS BY RX/DR IN RCRD: CPT | Mod: CPTII,S$GLB,, | Performed by: NURSE PRACTITIONER

## 2022-08-17 PROCEDURE — 99215 PR OFFICE/OUTPT VISIT, EST, LEVL V, 40-54 MIN: ICD-10-PCS | Mod: 95,,, | Performed by: NURSE PRACTITIONER

## 2022-08-17 PROCEDURE — 3046F HEMOGLOBIN A1C LEVEL >9.0%: CPT | Mod: CPTII,S$GLB,, | Performed by: NURSE PRACTITIONER

## 2022-08-17 PROCEDURE — 3077F PR MOST RECENT SYSTOLIC BLOOD PRESSURE >= 140 MM HG: ICD-10-PCS | Mod: CPTII,S$GLB,, | Performed by: NURSE PRACTITIONER

## 2022-08-17 PROCEDURE — 3046F PR MOST RECENT HEMOGLOBIN A1C LEVEL > 9.0%: ICD-10-PCS | Mod: CPTII,95,, | Performed by: NURSE PRACTITIONER

## 2022-08-17 PROCEDURE — 3008F BODY MASS INDEX DOCD: CPT | Mod: CPTII,S$GLB,, | Performed by: NURSE PRACTITIONER

## 2022-08-17 PROCEDURE — 1159F MED LIST DOCD IN RCRD: CPT | Mod: CPTII,S$GLB,, | Performed by: NURSE PRACTITIONER

## 2022-08-17 PROCEDURE — 3077F SYST BP >= 140 MM HG: CPT | Mod: CPTII,S$GLB,, | Performed by: NURSE PRACTITIONER

## 2022-08-17 PROCEDURE — 3046F PR MOST RECENT HEMOGLOBIN A1C LEVEL > 9.0%: ICD-10-PCS | Mod: CPTII,S$GLB,, | Performed by: NURSE PRACTITIONER

## 2022-08-17 PROCEDURE — 1159F PR MEDICATION LIST DOCUMENTED IN MEDICAL RECORD: ICD-10-PCS | Mod: CPTII,S$GLB,, | Performed by: NURSE PRACTITIONER

## 2022-08-17 PROCEDURE — 1160F RVW MEDS BY RX/DR IN RCRD: CPT | Mod: CPTII,95,, | Performed by: NURSE PRACTITIONER

## 2022-08-17 RX ORDER — DULAGLUTIDE 3 MG/.5ML
3 INJECTION, SOLUTION SUBCUTANEOUS
Qty: 4 PEN | Refills: 11 | Status: SHIPPED | OUTPATIENT
Start: 2022-08-17 | End: 2023-08-24

## 2022-08-17 RX ORDER — DAPAGLIFLOZIN 10 MG/1
10 TABLET, FILM COATED ORAL DAILY
Qty: 30 TABLET | Refills: 6 | Status: SHIPPED | OUTPATIENT
Start: 2022-08-17 | End: 2022-09-22

## 2022-08-17 RX ORDER — NIFEDIPINE 60 MG/1
60 TABLET, EXTENDED RELEASE ORAL DAILY
Qty: 30 TABLET | Refills: 11 | Status: SHIPPED | OUTPATIENT
Start: 2022-08-17 | End: 2022-12-22

## 2022-08-17 RX ORDER — METFORMIN HYDROCHLORIDE 1000 MG/1
1000 TABLET ORAL 2 TIMES DAILY WITH MEALS
Qty: 180 TABLET | Refills: 3 | Status: SHIPPED | OUTPATIENT
Start: 2022-08-17 | End: 2023-09-05

## 2022-08-17 RX ORDER — LOSARTAN POTASSIUM AND HYDROCHLOROTHIAZIDE 12.5; 1 MG/1; MG/1
1 TABLET ORAL DAILY
Qty: 90 TABLET | Refills: 3 | Status: SHIPPED | OUTPATIENT
Start: 2022-08-17 | End: 2023-08-31 | Stop reason: SDUPTHER

## 2022-08-17 RX ORDER — ATORVASTATIN CALCIUM 10 MG/1
10 TABLET, FILM COATED ORAL DAILY
Qty: 90 TABLET | Refills: 3 | Status: SHIPPED | OUTPATIENT
Start: 2022-08-17 | End: 2023-08-31 | Stop reason: SDUPTHER

## 2022-08-17 NOTE — PROGRESS NOTES
Subjective:       Patient ID: Josh Julio is a 37 y.o. male.    Chief Complaint: Annual Wellness Visit      Josh Julio is a 37 y.o. male who presents today for an annual wellness visit.     Patient reports increasing pressure at home over the past two weeks. States these elevated readings have been occurring despite taking his medications (losartan, hctz and norvasc).   He complains of headache but denies chest pain, SoB or vision changes.     Review of patient's allergies indicates:  No Known Allergies     Medication List with Changes/Refills   New Medications    LOSARTAN-HYDROCHLOROTHIAZIDE 100-12.5 MG (HYZAAR) 100-12.5 MG TAB    Take 1 tablet by mouth once daily.    NIFEDIPINE (PROCARDIA-XL) 60 MG (OSM) 24 HR TABLET    Take 1 tablet (60 mg total) by mouth once daily.   Current Medications    ASCORBIC ACID, VITAMIN C, (VITAMIN C) 1000 MG TABLET    Take 1 tablet (1,000 mg total) by mouth once daily.    BLOOD SUGAR DIAGNOSTIC STRP    1 strip by Misc.(Non-Drug; Combo Route) route 2 (two) times daily before meals.    BLOOD-GLUCOSE METER KIT    Use as instructed    CHOLECALCIFEROL, VITAMIN D3, 125 MCG (5,000 UNIT) TAB    Take 1 tablet (5,000 Units total) by mouth once daily.    DAPAGLIFLOZIN (FARXIGA) 5 MG TAB TABLET    Take 1 tablet (5 mg total) by mouth once daily.    IBUPROFEN (ADVIL,MOTRIN) 600 MG TABLET    Take 1 tablet (600 mg total) by mouth every 6 (six) hours as needed for Pain.    KETOCONAZOLE (NIZORAL) 2 % SHAMPOO    Apply topically twice a week.    LANCETS (LANCETS,ULTRA THIN) MISC    1 application by Misc.(Non-Drug; Combo Route) route 2 (two) times daily before meals.    METFORMIN (GLUCOPHAGE) 1000 MG TABLET    Take 1 tablet (1,000 mg total) by mouth 2 (two) times daily with meals.    MULTIVITAMIN (THERAGRAN) PER TABLET    Take 1 tablet by mouth once daily.    TRULICITY 1.5 MG/0.5 ML PEN INJECTOR    Inject 1.5 mg into the skin every 7 days.    ZINC GLUCONATE 50 MG TABLET    Take 1 tablet (50  mg total) by mouth once daily.   Changed and/or Refilled Medications    Modified Medication Previous Medication    ATORVASTATIN (LIPITOR) 10 MG TABLET atorvastatin (LIPITOR) 10 MG tablet       Take 1 tablet (10 mg total) by mouth once daily.    Take 1 tablet (10 mg total) by mouth once daily.   Discontinued Medications    AMLODIPINE (NORVASC) 10 MG TABLET    Take 1 tablet (10 mg total) by mouth once daily.    HYDROCHLOROTHIAZIDE (HYDRODIURIL) 12.5 MG TAB    Take 1 tablet (12.5 mg total) by mouth once daily.    HYDROCODONE-ACETAMINOPHEN (NORCO) 5-325 MG PER TABLET    Take 1 tablet by mouth every 6 (six) hours as needed for Pain.    HYDROCODONE-ACETAMINOPHEN (NORCO) 5-325 MG PER TABLET    Take 1 tablet by mouth every 6 hours as needed for pain    KETOCONAZOLE (NIZORAL) 2 % CREAM    Apply topically once daily.    LOSARTAN (COZAAR) 100 MG TABLET    Take 1 tablet (100 mg total) by mouth once daily.       Health Maintenance    Pneumonia Vaccine: Declined   Pneumonia 7 & 23 Vaccines: 09/09/1999  Tetanus/Tdap: Declined   Hepatitis C Screen: 05/21/2021  HIV Screen: 10/24/2020  Eye Exam: Referral placed   Foot Exam: 08/17/2022   Microalbumin: 05/21/2021   (>30 @ 72.7)      Patient ambulates on his own without an assistive device.     On average, how many days per week do you do moderate to strenuous exercise:  (like a brisk walk or jog; this does not include your job/work)  0     On average, how many minutes do you exercise at this level each day? 0    Do you eat fruits and vegetable every day?  Yes    Do you always wear your seat belt in the car?  Yes    Do you Text while driving? Somtimes     Have you ever used tobacco products? No    Do you go to the dentist regularly? Yes  When was you last exam:     Do you go to the eye doctor regularly? No  When was your last exam:    Do you wear contacts, glasses, reading glasses? No    Do you have any personal or family history of breast/ovarian/colon cancer?  No       If yes,  "who?    Do you have any personal or family history of heart disease? No       If yes, who?    Have you often been bothered by feeling down, depressed, or hopeless?  Not at all    Have you often been bothered by having little interest or pleasure in doing things?  Not at all    How often do you drink alcohol?  Weekends    How often have you used Marijuana or other illicit drugs in the past year?  never    Review of Systems   Constitutional: Negative for chills and fever.   Eyes: Negative for blurred vision.   Respiratory: Negative for cough and shortness of breath.    Cardiovascular: Negative for chest pain.   Musculoskeletal: Negative for back pain.   Neurological: Positive for headaches.       Objective:     BP (!) 220/130 (BP Location: Right arm, Patient Position: Sitting, BP Method: Large (Manual))   Pulse 95   Temp 97.5 °F (36.4 °C)   Resp 20   Ht 6' 3" (1.905 m)   Wt (!) 139.5 kg (307 lb 8.7 oz)   SpO2 96%   BMI 38.44 kg/m²     Physical Exam  Vitals reviewed.   Constitutional:       Appearance: Normal appearance.   HENT:      Head: Normocephalic and atraumatic.   Cardiovascular:      Rate and Rhythm: Normal rate and regular rhythm.      Heart sounds: Normal heart sounds. No murmur heard.  Pulmonary:      Effort: Pulmonary effort is normal.      Breath sounds: Normal breath sounds. No wheezing.   Skin:     General: Skin is warm and dry.   Neurological:      Mental Status: He is alert and oriented to person, place, and time.          Protective Sensation (w/ 10 gram monofilament):  Right: Intact  Left: Intact    Visual Inspection:  Normal -  Bilateral    Pedal Pulses:   Right: Present  Left: Present    BP Readings from Last 3 Encounters:   08/17/22 (!) 220/130   11/19/21 (!) 204/125   06/17/21 (!) 148/108     Wt Readings from Last 3 Encounters:   08/17/22 (!) 139.5 kg (307 lb 8.7 oz)   11/19/21 136.1 kg (300 lb)   06/17/21 (!) 137.1 kg (302 lb 4 oz)       Last Labs:  Glucose   Date Value Ref Range Status "   08/12/2022 192 (H) 70 - 110 mg/dL Final   05/21/2021 227 (H) 70 - 110 mg/dL Final   05/21/2021 227 (H) 70 - 110 mg/dL Final     BUN   Date Value Ref Range Status   08/12/2022 11 6 - 20 mg/dL Final   05/21/2021 13 6 - 20 mg/dL Final   05/21/2021 13 6 - 20 mg/dL Final     Creatinine   Date Value Ref Range Status   08/12/2022 1.0 0.5 - 1.4 mg/dL Final   05/21/2021 1.1 0.5 - 1.4 mg/dL Final   05/21/2021 1.1 0.5 - 1.4 mg/dL Final     Cholesterol   Date Value Ref Range Status   08/12/2022 221 (H) 120 - 199 mg/dL Final     Comment:     The National Cholesterol Education Program (NCEP) has set the  following guidelines (reference ranges) for Cholesterol:  Optimal.....................<200 mg/dL  Borderline High.............200-239 mg/dL  High........................> or = 240 mg/dL     05/21/2021 206 (H) 120 - 199 mg/dL Final     Comment:     The National Cholesterol Education Program (NCEP) has set the  following guidelines (reference ranges) for Cholesterol:  Optimal.....................<200 mg/dL  Borderline High.............200-239 mg/dL  High........................> or = 240 mg/dL       Hemoglobin A1C   Date Value Ref Range Status   08/12/2022 11.6 (H) 4.0 - 5.6 % Final     Comment:     ADA Screening Guidelines:  5.7-6.4%  Consistent with prediabetes  >or=6.5%  Consistent with diabetes    High levels of fetal hemoglobin interfere with the HbA1C  assay. Heterozygous hemoglobin variants (HbS, HgC, etc)do  not significantly interfere with this assay.   However, presence of multiple variants may affect accuracy.     05/21/2021 12.1 (H) 4.0 - 5.6 % Final     Comment:     ADA Screening Guidelines:  5.7-6.4%  Consistent with prediabetes  >or=6.5%  Consistent with diabetes    High levels of fetal hemoglobin interfere with the HbA1C  assay. Heterozygous hemoglobin variants (HbS, HgC, etc)do  not significantly interfere with this assay.   However, presence of multiple variants may affect accuracy.       Hemoglobin   Date Value  Ref Range Status   08/12/2022 14.0 14.0 - 18.0 g/dL Final   05/21/2021 13.4 (L) 14.0 - 18.0 g/dL Final     Hematocrit   Date Value Ref Range Status   08/12/2022 42.5 40.0 - 54.0 % Final   05/21/2021 41.5 40.0 - 54.0 % Final     Vit D, 25-Hydroxy   Date Value Ref Range Status   05/21/2021 27 (L) 30 - 96 ng/mL Final     Comment:     Vitamin D deficiency.........<10 ng/mL                              Vitamin D insufficiency......10-29 ng/mL       Vitamin D sufficiency........> or equal to 30 ng/mL  Vitamin D toxicity............>100 ng/mL     10/24/2020 26 (L) 30 - 96 ng/mL Final     Comment:     Vitamin D deficiency.........<10 ng/mL                              Vitamin D insufficiency......10-29 ng/mL       Vitamin D sufficiency........> or equal to 30 ng/mL  Vitamin D toxicity............>100 ng/mL         I have reviewed the following:     Details / Date    [x]   Labs     []   Micro     []   Pathology     []   Imaging     []   Cardiology Procedures     [x]   Other  Patient's Medical and Surgical History        Assessment and Plan:     1. Encounter for annual health examination    --Immunizations reviewed.  --Discussed benefits of maintaining up to date health maintenance.    2. Hypertension associated with diabetes    Chronic, uncontrolled. Will combine pills into Hyzaar to diminish pill burden. Will change Norvasc to Nifedipine in an attempt to get better control. Patient checks pressures at home. Advised to call with readings on Friday so dose adjustments can be made. Would like him back in clinic when he is able to compare his cuff to ours.     - losartan-hydrochlorothiazide 100-12.5 mg (HYZAAR) 100-12.5 mg Tab; Take 1 tablet by mouth once daily.  Dispense: 90 tablet; Refill: 3  - NIFEdipine (PROCARDIA-XL) 60 MG (OSM) 24 hr tablet; Take 1 tablet (60 mg total) by mouth once daily.  Dispense: 30 tablet; Refill: 11    3. Type 2 diabetes mellitus with hyperglycemia, without long-term current use of  insulin    Chronic, stable, follow up with Diabetes NP today     - Ambulatory referral/consult to Ophthalmology; Future    4. Hyperlipidemia associated with type 2 diabetes mellitus    Chronic, elevated, refill Lipitor and continue, monitor     - atorvastatin (LIPITOR) 10 MG tablet; Take 1 tablet (10 mg total) by mouth once daily.  Dispense: 90 tablet; Refill: 3    5. Class 2 severe obesity with serious comorbidity and body mass index (BMI) of 38.0 to 38.9 in adult, unspecified obesity type

## 2022-08-17 NOTE — PROGRESS NOTES
37 y.o. male here for 1 year follow up - for diabetes management.   Type 2 -     a1c is still elevated at 11.6%. it has come down [facility] last visit.   But is still very elevated.   He continues metformin 1000mg twice per day.   Did start taking farxiga and is taking 5mg every morning.   Also on trulcity- his dose as incrased to 1.5m every week -   Checks sugars daily usually and states they run in the 200's.     Admits not eating correctly - fast food still and a coke usually once per week.   He thinks he has gained weight over the past year.     He is working a lot, two jobs.   Has 5 children =     Last visit notes from June 2021:   HPI: Josh Julio is a 37 y.o.  male c/I for visit to address Diabetes Type 2  This is the first time I am seeing him for care, follows with Dr. James Pitts MD for primary care needs.   Has not seen endocrinology or diabetes specialist in the past.     was diagnosed with T2DM about 2 years ago - but may have had prior.   States was not really going to the doctor much beforehand.   Is motivated/ready to take control of his health.   Maternal grandmother had T2dm   Has never been hospitalized r/t DM.  Denies missing doses of DM medication.     Current a1c is elevated @ 12.1 %   He is on metformin 1000 mg twice per day. Doesn't skip doses.   Had been taking for about 2 years.   Has not taken any other medication for diabetes.   Was recommended at last visit to start on trulicity, however patient states he has never gotten any new prescriptions.    He has recently started checking sugars in morning.   States running 200 - 240's - personal monitor.   He admits does not follow ADA diet - however isn't sure what he should eat/not eat.   Used to drink 3 cokes per day but recently switched to orange juice and sugar free gatorade.     Complications from diabetes include:   Negative for DKA.   Has never taken insulin in life.   -negative for diabetic neuropathy.   -negative for  nephropathy.   +microalbuminuria.   Eyes - negative for known Diabetic retinopathy   Denies known CV disease or CAD.   HTN - Losartan and norvasc.   HLD - lipitor 10 - states just started on recently.   He denies any acute complaints or symptoms of diabetes -   Denies fatigue, polydipsia, polyuria, headache or vision changes.     Past medical History:   Past Medical History:   Diagnosis Date    Benign essential hypertension 10/21/2020    BMI 38.0-38.9,adult 10/21/2020    Essential hypertension     HCV antibody positive 10/26/2020    Mixed hyperlipidemia 10/24/2020    Type 2 diabetes mellitus without complication, without long-term current use of insulin 10/21/2020    Vitamin D insufficiency 10/24/2020      Family hx:   Family History   Problem Relation Age of Onset    Hypertension Mother     No Known Problems Father     Diabetes Maternal Grandmother     Hypertension Maternal Grandmother     Kidney disease Maternal Grandmother     Hypertension Brother       Current meds:   Current Outpatient Medications:     ascorbic acid, vitamin C, (VITAMIN C) 1000 MG tablet, Take 1 tablet (1,000 mg total) by mouth once daily., Disp: , Rfl:     atorvastatin (LIPITOR) 10 MG tablet, Take 1 tablet (10 mg total) by mouth once daily., Disp: 90 tablet, Rfl: 3    blood sugar diagnostic Strp, 1 strip by Misc.(Non-Drug; Combo Route) route 2 (two) times daily before meals., Disp: 200 strip, Rfl: 4    blood-glucose meter kit, Use as instructed, Disp: 1 each, Rfl: 0    cholecalciferol, vitamin D3, 125 mcg (5,000 unit) Tab, Take 1 tablet (5,000 Units total) by mouth once daily., Disp: , Rfl:     dapagliflozin (FARXIGA) 10 mg tablet, Take 1 tablet (10 mg total) by mouth once daily., Disp: 30 tablet, Rfl: 6    dulaglutide (TRULICITY) 3 mg/0.5 mL pen injector, Inject 3 mg into the skin every 7 days., Disp: 4 pen, Rfl: 11    ibuprofen (ADVIL,MOTRIN) 600 MG tablet, Take 1 tablet (600 mg total) by mouth every 6 (six) hours as  needed for Pain., Disp: 20 tablet, Rfl: 0    ketoconazole (NIZORAL) 2 % shampoo, Apply topically twice a week., Disp: 240 mL, Rfl: 3    lancets (LANCETS,ULTRA THIN) Misc, 1 application by Misc.(Non-Drug; Combo Route) route 2 (two) times daily before meals., Disp: 200 each, Rfl: 4    losartan-hydrochlorothiazide 100-12.5 mg (HYZAAR) 100-12.5 mg Tab, Take 1 tablet by mouth once daily., Disp: 90 tablet, Rfl: 3    metFORMIN (GLUCOPHAGE) 1000 MG tablet, Take 1 tablet (1,000 mg total) by mouth 2 (two) times daily with meals., Disp: 180 tablet, Rfl: 3    multivitamin (THERAGRAN) per tablet, Take 1 tablet by mouth once daily., Disp: , Rfl:     NIFEdipine (PROCARDIA-XL) 60 MG (OSM) 24 hr tablet, Take 1 tablet (60 mg total) by mouth once daily., Disp: 30 tablet, Rfl: 11    zinc gluconate 50 mg tablet, Take 1 tablet (50 mg total) by mouth once daily., Disp:  , Rfl:      Current Diabetes medications:   Metformin 1000mg bid.   Trulicity 1.5mg SC eery week.   farxiga 5mg tablet per day.     Medications Tried and Failed:   As above     Review of Pertinent co-morbidities/risk factors:   CV: Denies history of MI nor stroke. States stroke history in his family.   CAD: Denies.  Takes aspirin 81mg tablet daily  BP: has history of HTN  Statin: Taking  ACE/ARB: Taking    Social History     Tobacco Use   Smoking Status Never Smoker   Smokeless Tobacco Never Used      Social:   Lives at home with: fiance - 3 kids but they live with mother, has 2 stepchildren with them - 6 and 13 y.o.  Life changes/stressors currently: recently got  -   Diet: following ADA diet   Meals: 3 per day and snacks.        Breakfast - sausage mcgriddle        Lunch - fast food - pope[employer], burger benjamin, [employer] -       Dinner - fast food - [employer]        Snacks - chips, brownies         Drinks - cokes - 3 per day, orange juice, sugar free gatorade.   Exercise: none.   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  Also 2nd  job - [employer] - .     Glucose Monitoring:   Checking sugar daily in the morning. 200 - 250's.     Standards of care:   Eyes: .Most Recent Eye Exam Date: Not Found  Foot exam: : 08/17/2022   Diabetes education: None.    Vital Signs  There were no vitals taken for this visit.    Pertinent Labs:   Hgba1c   Lab Results   Component Value Date    HGBA1C 11.6 (H) 08/12/2022    HGBA1C 12.1 (H) 05/21/2021    HGBA1C 13.9 (H) 10/24/2020     Lipid panel   Lab Results   Component Value Date    CHOL 221 (H) 08/12/2022    CHOL 206 (H) 05/21/2021    CHOL 186 10/24/2020     Lab Results   Component Value Date    HDL 46 08/12/2022    HDL 40 05/21/2021    HDL 40 10/24/2020     Lab Results   Component Value Date    LDLCALC 118.4 08/12/2022    LDLCALC 89.0 05/21/2021    LDLCALC 71.4 10/24/2020     Lab Results   Component Value Date    TRIG 283 (H) 08/12/2022    TRIG 385 (H) 05/21/2021    TRIG 373 (H) 10/24/2020     Lab Results   Component Value Date    CHOLHDL 20.8 08/12/2022    CHOLHDL 19.4 (L) 05/21/2021    CHOLHDL 21.5 10/24/2020      CMP  Glucose   Date Value Ref Range Status   08/12/2022 192 (H) 70 - 110 mg/dL Final     BUN   Date Value Ref Range Status   08/12/2022 11 6 - 20 mg/dL Final     Creatinine   Date Value Ref Range Status   08/12/2022 1.0 0.5 - 1.4 mg/dL Final     eGFR if    Date Value Ref Range Status   05/21/2021 >60.0 >60 mL/min/1.73 m^2 Final   05/21/2021 >60.0 >60 mL/min/1.73 m^2 Final     eGFR if non    Date Value Ref Range Status   05/21/2021 >60.0 >60 mL/min/1.73 m^2 Final     Comment:     Calculation used to obtain the estimated glomerular filtration  rate (eGFR) is the CKD-EPI equation.      05/21/2021 >60.0 >60 mL/min/1.73 m^2 Final     Comment:     Calculation used to obtain the estimated glomerular filtration  rate (eGFR) is the CKD-EPI equation.        AST   Date Value Ref Range Status   08/12/2022 24 10 - 40 U/L Final     ALT   Date Value Ref Range Status    08/12/2022 31 10 - 44 U/L Final     Microalbumin creatinine ratio:   Lab Results   Component Value Date    ASIA 72.7 (H) 05/21/2021       Review Of Systems:   Gen: Appetite good, + weight gain, denies fatigue and weakness. Denies polydipsia.   Skin: Skin is intact and heals well, denies any rashes or hair changes.   EENT: Denies any acute visual disturbances, nor blurred vision.   Resp: Denies SOB or Dyspnea on exertion, denies cough.   Cardiac: Denies chest pain, palpitations, or swelling.   GI: Denies abdominal pain, nausea or vomiting, diarrhea, or constipation.   /GYN: Denies nocturia, nor burning, frequency or pain on urination.  MS/Neuro: Denies numbness/ tingling in BLE; Gait steady, speech clear  Psych: Denies drug/ETOH abuse, no hx of depression.  Other systems: negative.    Physical Exam: limited as this was a virtual visit -   GENERAL: Well developed, well nourished in appearance.   PSYCH: AAOx3, appropriate mood and affect, pleasant expression, conversant, appears relaxed, well groomed.   EYES: PERRL, Conjunctiva and corneas clear  NECK: trachea midline,   CHEST: Even, regular, and unlabored respirations    Assessment and Plan of Care:     Diagnoses and all orders for this visit:    Type 2 diabetes mellitus with hyperglycemia, without long-term current use of insulin  -     dapagliflozin (FARXIGA) 10 mg tablet; Take 1 tablet (10 mg total) by mouth once daily.  -     metFORMIN (GLUCOPHAGE) 1000 MG tablet; Take 1 tablet (1,000 mg total) by mouth 2 (two) times daily with meals.    Hyperlipidemia associated with type 2 diabetes mellitus    Hypertension associated with diabetes    Other orders  -     dulaglutide (TRULICITY) 3 mg/0.5 mL pen injector; Inject 3 mg into the skin every 7 days.       1. T2DM with hyperglycemia- Hgba1c goal is 7.5% or less without hypoglycemia - 13.9%---> 12.1%--> 11.6%   discussed DM, progression of disease, long term complications, CV risk factors and tx options.    Advise compliance with ADA diet and encourage exercise- gave list.   Placed referral for diabetes educator.   Continue metformin 1000 bid.   Continue - farxiga - increase from 5 to 10mg every day.   Continue trulicity - increase dose from 1.5 to 3mg every week   Will start insulin - basal insulin next visit if glucoses are still not at goal.   He does want to work on diet one last time before starting on insulin - I reviewed CV risk factors/reinforced with high blood sugar.   Give injection 1 time per week.   No history of pancreatitis or medullary thyroid cancer.   May plan to increase dose of trulicity if tolerated well.   Notify me if side effects such as severe abdominal pain, nausea, diarrhea.   For farxiga ---> Discussed MOA, possible side effects including yeast infection, UTI, dehydration and ketoacidosis, importance of maintaining hydration and avoiding No carb diets. Good use hygiene. Notify my office if any side effects. Will monitor renal function closely. Stable at present.      Instructed to monitor Blood glucose daily in morning, and bring meter/ log to every clinic visit.     2. Blood Pressure- high/elevated - continue meds as previously prescribed and monitor.   Recheck lower 140 /90 - at last visit - today was 220/130  Losartan, norvasc.- just had adjusted by DEVEN Rajput in clinic today -   Recheck again next 4 - 6 weeks.     3. HLD - LDL goal < 100. ldl stable, high trigs.   Advised diet changes/eliminate cokes and fast foods.   Currently on statin therapy- looks like it was just restarted lipitor 10mg every HS    4. Weight - BMI There is no height or weight on file to calculate BMI.   Encourage Ada diet and exercise.   Increase the dose of the glp1.     5. Renal Function - stable.   No nephropathy.   + urine mac.    [id number].        Follow up in 6 weeks with OV

## 2022-08-17 NOTE — PATIENT INSTRUCTIONS
"Continue metformin 1000mg twice per day   Continue trulicity - increase dose from 1.5mg to 3mg every week.   Continue farxiga - increase dose from 5 to 10mg tablet daily.     Check the blood sugar daily before a meal -   Normal fasting sugars should range 80 - 120's.     Will see you for follow up in 6 weeks time with glucose chart/logs.   We need to consider starting you on insulin once per day if your glucoses remain elevated by next visit.       Low Carb Snacks & Diabetes friendly foods   Aim for 30 - 40 grams of carbs for 3 meals per day (or 2 meals and  1 - 2 snacks - however you prefer)  Snacks can be 15 - 20 grams of carbs.     Eating small, frequent meals will help you to feel more satisfied, and more full so that you don't over eat or eat the wrong foods later.   Also, meme meals/snacks allow you to spread carbohydrates evenly, which may stabilize blood sugars.   Below you will find a list of ideas of foods that I like/prefer.   Feel free to give me your ideas and tips if you find good ones too!   Overall - please remember to limit refined sugars such as soft drinks, juices, rices, pastas, breads, cakes.   Look at the back of the label - look at the amount of "carbohydrates", then look at the amount of "fiber" - subtract the amount of fiber from the amount of carbs - this is your "net carb intake".  The more fiber a food has, the better generally, as you get to subtract this from the net carbs.     0-5 gm carb  Crystal Light flavoring (I like fruit punch flavor!)  Vitamin Water Zero  Mariela antioxidant drinks.   Sparkling ice (long skinny flavored water bottles for $1 that are sugar free).   Juana water, WaterLoo - carbonated flavored whitfield, various flavors.  Diet coke, diet barqs, sprite zero.  Diet sunkist (orange drink)  Sugar free powerade  Herbal tea, unsweetened  2 tsp peanut butter on celery or carrots  Shaniqua's original Candies - (the Sugar Free ones!)  1/2 cup sugar-free jell-o  1 sugar-free " "popsicle  ¼ cup blueberries or strawberries  8oz Blue Xiomy unsweetened almond milk (or there is sugar free vanilla flavored as well).  5 baby carrots & celery sticks, cucumbers, bell peppers dipped in ¼ cup salsa, 2Tbsp light ranch dressing or 2Tbsp plain Greek yogurt  10 Goldfish crackers  ½ oz low-fat cheese or string cheese  1 closed handful of nuts, unsalted (example-->almonds, pistachios, cashews, peanuts, etc).  1 Tbsp of sunflower seeds, unsalted  1 cup Smart Pop popcorn  1 whole grain brown rice cake   "Think Thin" protein bars - my personal favorite is Creamy Peanut butter, chocolate brownie, or oreo flavors.  "Rivero" bars  - can be found at [employer] grocery store - they have 5 grams of net carbohydrates.  Quest bars (my favorite is birthday cake, and also cinnamon roll is good melted for 10 seconds in the microwave - please remove the wrapper first!)  Premier protein shakes - sold at go[employer], or other brand alternatives - usually 1 - 2 grams of carbs (strawberry, vanilla, chocolate flavors) Coffee flavor is my new morning favorite!   Scrambled eggs! Or a fried egg or boiled eggs - add sliced tomatoes, cilantro or some chopped green onions.   Eggs are good with any and all veggies! You can even eat them for dinner or in a low carb tortilla, or served with your favorite grilled meat/sausage or silva is my favorite.   Check out pre-made egg scrambles in the egg grocery store section - Ore Alyssa "just crack an egg" is premixed cheese, silva and small amount of pototes, small cup size portions for your breakfast - very convenient!   Cristal delgadillo - zucchini - can buy in the frozen foods section. Birds eye brand is usually cheap. Tip - saute with oil/onions or italian seasoning and use this as a pasta substitution.  Milk - is usually high in carbs/sugar - use AppGeek milk brand instead - it is "filtered" milk - with half the amount of carbs of regular milk. (next to the milk in milk aisle).   Smuckers sugar " "free Breakfast syrup (or 1 tablespoon of low sugar breakfast syrup) instead of regular syrup.        15 gm carb  1 small piece of fruit or ½ banana or 1/2 cup lite canned fruit  3 ezra cracker squares  3 cups Smart Pop popcorn, top spray butter, Alatorre lite salt or cinnamon and Truvia  5 Vanilla Wafers  ½ cup low fat, no added sugar ice cream or frozen yogurt (Blue bell, Blue Bunny, Weight Watchers, Skinny Cow)  1/2 - 1 cup Light n' fit Vanilla yogurt (has added protein in it to make you feel full).   ½ turkey, ham, or chicken sandwich  ½ c fruit with ½ c Cottage cheese  4-6 unsalted wheat crackers with 1 oz low fat cheese or 1 tbsp peanut butter   30-45 goldfish crackers (depending on flavor)   7-8 Pentecostalism mini brown rice cakes (caramel, apple cinnamon, chocolate)   12 Pentecostalism mini brown rice cakes (cheddar, bbq, ranch)   1/3 cup hummus dip with raw veg  1/2 whole wheat al, 1Tbsp hummus  Mini Pizza (1/2 whole wheat English muffin, low-fat  cheese, tomato sauce)  100 calorie snack pack (Oreo, Chips Ahoy, Ritz Mix, Baked Cheetos)  4-6 oz. light or Greek Style yogurt (Chobani, Yoplait, Okios, Stoneyfield)  ½ cup sugar-free pudding    6 in. wheat tortilla or al oven toasted chips (topped with spray butter flavoring, cinnamon, Truvia OR spray butter, garlic powder, chili powder)   18 BBQ Popchips (available at [employer], [employer], [employer])  Mini bagel (small size) toasted - add fat free cream cheese or avocado and sliced tomato on top - yum!   1/2 cup Halo top icecream - birthday cake is my go-to flavor.   Truth Bars - can be found at [employer] - Net carbs is 11 - 12 grams depending on the flavor.   Kind bars = mostly nuts and dried berries - find at most local groceries stores, drug stores, whole foods, fresh market. Net carbs is around 10 grams.     Jared Tineo - I'm often asked "what smoothie is healthy for me". Get the 20 oz. Size.   Do not be decieved to think that all smoothies are "healthy". In " "fact, most are loaded with hidden sugars.   Here are some from the menu that you are allowed to have being diabetic:   The gladiator - any flavor. This is the lowest in carbs (3 - 5 grams only)  Keto champ (berry, chocolate or coffee - all have 14 - 19 grams of carbs in 20 oz size).   The Lean 1 smoothies - vanilla, strawberry and chocolate have under 30 grams of carbs, so this is slightly more. But would be ok for a meal substitute or snack.   The Shredder in Vanilla or chocolate only.  17 grams of carbs - (the strawberry has more sugar considerably)    Tips and Tricks:     Eat an extra vegetable once per day - green veggies (such as broccoli, cauliflower, green beans) - make you feel full and are low in sugar.     My favorite "secret" seasoning to make things extra yummy is cinnamon for sweet taste   For an added secret "salty" taste - add "everything but the bagel" seasoning (you can get on amazon.com or at  joes. I have seen at local groceries such as [employer] too!).     Dark colored fruits are your friend -- blueberries, strawberries, raspberries, blackberries. They have a lower sugar content. So will be the best choice for you.   Light colored fruits are NOT your friend - they tend to be higher in sugar and are not the best options for an ADA diet - examples are oranges, bananas, peaches, watermelon, -- you can eat these, but carefully and in moderation.     WATER. I cannot emphasize drinking water enough - it will hydrate you, make you full. Your body needs it - it's a natural appetite suppressant.   Sometimes hunger and thirst can feel the same. Try drinking some water first, then eat if you are still hungry.     Other snack choices   Celery with peanut butter  Celery with tuna salad  Dill pickles and cheddar cheese (no kidding, it's a great combo)  Nuts (keep raw ones in the freezer if you think you'll overeat them)  Sunflower seeds (get them in the shell so it will take longer to eat them)  Other seeds " "(How to Toast Pumpkin or Squash Seeds)   Pistachios or almonds - will fill you up and are tasty!   Low-Carb Trail Mix  Jerky (beef or turkey -- try to find low-sugar varieties)  Salami slices (you can find in the deli section)  Cheese sticks, such as string cheese  Sugar-free Jello, alone or with cottage cheese and a sprinkling of nuts. Make sugar-free lime Jello with part coconut milk -- For a large package, dissolve the powder in a cup of boiling water, add a can of coconut milk, and then add the rest of the water. Stir well.  Pepperoni "chips" -- Zap the slices in the microwave  Cheese with a few apple slices  4-ounce plain or sugar-free yogurt with berries and flax seed meal  Smoked salmon and cream cheese on cucumber slices  Lettuce Roll-ups -- Roll luncheon meat, egg salad, tuna or other filling and veggies in lettuce leaves  Lunch Meat Roll-ups -- Roll cheese or veggies in lunch meat (read the labels for carbs on the lunch meat)  Spread bean dip, spinach dip, or other low-carb dip or spread on the lunch meat or lettuce and then roll it up  Raw veggies and spinach dip, or other low-carb dip  Pork rinds (Chicharrón), with or without dip  Ricotta cheese with fruit and/or nuts and/or flax seed meal  Mushrooms with cheese spread inside (or other spreads or dips)  Low-carb snack bars (watch out for sugar alcohols, especially maltitol)  Product Review: Atkins Advantage Bars  Pepperoni Chips -- Microwave pepperoni slices until crisp. Great with cheeses and dips  Garlic Parmesan Flax Seed Crackers  Parmesan Crisps -- Good when you want a crunchy snack.  Peanut Butter Protein Balls     "

## 2022-08-18 ENCOUNTER — TELEPHONE (OUTPATIENT)
Dept: PHARMACY | Facility: CLINIC | Age: 37
End: 2022-08-18
Payer: COMMERCIAL

## 2022-08-19 ENCOUNTER — TELEPHONE (OUTPATIENT)
Dept: INTERNAL MEDICINE | Facility: CLINIC | Age: 37
End: 2022-08-19
Payer: COMMERCIAL

## 2022-08-19 NOTE — TELEPHONE ENCOUNTER
Called pt to request BP readings. No answer. LVM to call the clinic or send readings on the portal.

## 2022-08-29 ENCOUNTER — TELEPHONE (OUTPATIENT)
Dept: INTERNAL MEDICINE | Facility: CLINIC | Age: 37
End: 2022-08-29
Payer: COMMERCIAL

## 2022-08-29 NOTE — TELEPHONE ENCOUNTER
Left message on portal for pt to send list of blood pressure readings. Called and was unable to leave VM.

## 2022-09-01 ENCOUNTER — PATIENT MESSAGE (OUTPATIENT)
Dept: INTERNAL MEDICINE | Facility: CLINIC | Age: 37
End: 2022-09-01
Payer: COMMERCIAL

## 2022-09-01 NOTE — TELEPHONE ENCOUNTER
Called pt to get BP readings. No answer. LVM. Pt state having trouble uploading.     Sent message on portal.

## 2022-09-12 ENCOUNTER — OFFICE VISIT (OUTPATIENT)
Dept: ORTHOPEDICS | Facility: CLINIC | Age: 37
End: 2022-09-12
Payer: COMMERCIAL

## 2022-09-12 ENCOUNTER — PATIENT MESSAGE (OUTPATIENT)
Dept: INTERNAL MEDICINE | Facility: CLINIC | Age: 37
End: 2022-09-12
Payer: COMMERCIAL

## 2022-09-12 ENCOUNTER — HOSPITAL ENCOUNTER (OUTPATIENT)
Dept: RADIOLOGY | Facility: HOSPITAL | Age: 37
Discharge: HOME | End: 2022-09-12
Attending: PHYSICIAN ASSISTANT
Payer: COMMERCIAL

## 2022-09-12 VITALS — HEIGHT: 75 IN | BODY MASS INDEX: 37.46 KG/M2 | WEIGHT: 301.25 LBS

## 2022-09-12 DIAGNOSIS — M75.82 ROTATOR CUFF TENDONITIS, LEFT: ICD-10-CM

## 2022-09-12 DIAGNOSIS — M25.512 ACUTE PAIN OF LEFT SHOULDER: ICD-10-CM

## 2022-09-12 DIAGNOSIS — M25.512 ACUTE PAIN OF LEFT SHOULDER: Primary | ICD-10-CM

## 2022-09-12 PROCEDURE — 1159F PR MEDICATION LIST DOCUMENTED IN MEDICAL RECORD: ICD-10-PCS | Mod: CPTII,S$GLB,, | Performed by: PHYSICIAN ASSISTANT

## 2022-09-12 PROCEDURE — 99203 PR OFFICE/OUTPT VISIT, NEW, LEVL III, 30-44 MIN: ICD-10-PCS | Mod: S$GLB,,, | Performed by: PHYSICIAN ASSISTANT

## 2022-09-12 PROCEDURE — 1160F PR REVIEW ALL MEDS BY PRESCRIBER/CLIN PHARMACIST DOCUMENTED: ICD-10-PCS | Mod: CPTII,S$GLB,, | Performed by: PHYSICIAN ASSISTANT

## 2022-09-12 PROCEDURE — 73030 XR SHOULDER TRAUMA 3 VIEW LEFT: ICD-10-PCS | Mod: 26,LT,, | Performed by: RADIOLOGY

## 2022-09-12 PROCEDURE — 1159F MED LIST DOCD IN RCRD: CPT | Mod: CPTII,S$GLB,, | Performed by: PHYSICIAN ASSISTANT

## 2022-09-12 PROCEDURE — 3046F HEMOGLOBIN A1C LEVEL >9.0%: CPT | Mod: CPTII,S$GLB,, | Performed by: PHYSICIAN ASSISTANT

## 2022-09-12 PROCEDURE — 99999 PR PBB SHADOW E&M-EST. PATIENT-LVL III: CPT | Mod: PBBFAC,,, | Performed by: PHYSICIAN ASSISTANT

## 2022-09-12 PROCEDURE — 99999 PR PBB SHADOW E&M-EST. PATIENT-LVL III: ICD-10-PCS | Mod: PBBFAC,,, | Performed by: PHYSICIAN ASSISTANT

## 2022-09-12 PROCEDURE — 73030 X-RAY EXAM OF SHOULDER: CPT | Mod: TC,LT

## 2022-09-12 PROCEDURE — 99203 OFFICE O/P NEW LOW 30 MIN: CPT | Mod: S$GLB,,, | Performed by: PHYSICIAN ASSISTANT

## 2022-09-12 PROCEDURE — 1160F RVW MEDS BY RX/DR IN RCRD: CPT | Mod: CPTII,S$GLB,, | Performed by: PHYSICIAN ASSISTANT

## 2022-09-12 PROCEDURE — 3008F PR BODY MASS INDEX (BMI) DOCUMENTED: ICD-10-PCS | Mod: CPTII,S$GLB,, | Performed by: PHYSICIAN ASSISTANT

## 2022-09-12 PROCEDURE — 3046F PR MOST RECENT HEMOGLOBIN A1C LEVEL > 9.0%: ICD-10-PCS | Mod: CPTII,S$GLB,, | Performed by: PHYSICIAN ASSISTANT

## 2022-09-12 PROCEDURE — 73030 X-RAY EXAM OF SHOULDER: CPT | Mod: 26,LT,, | Performed by: RADIOLOGY

## 2022-09-12 PROCEDURE — 3008F BODY MASS INDEX DOCD: CPT | Mod: CPTII,S$GLB,, | Performed by: PHYSICIAN ASSISTANT

## 2022-09-12 RX ORDER — METHOCARBAMOL 500 MG/1
500 TABLET, FILM COATED ORAL EVERY 8 HOURS PRN
Qty: 30 TABLET | Refills: 0 | Status: SHIPPED | OUTPATIENT
Start: 2022-09-12 | End: 2022-09-22

## 2022-09-12 NOTE — PROGRESS NOTES
SUBJECTIVE:     Chief Complaint & History of Present Illness:  Josh Julio is a 37 y.o. year old male who presents today with constant left shoulder pain that started last week.  He is Right hand dominant.  There is not a history of injury.  The pain is located in the  lateral and  upper arm aspect of the shoulder.  The pain is described as achy.  He works on the [employer].  It is aggravated by lifting and overhead activity.  Seen in urgent care yesterday- had toradol injection and prescribed tramadol. There is not a history of previous injury or surgery to the shoulder.      Review of patient's allergies indicates:  No Known Allergies      Current Outpatient Medications   Medication Sig Dispense Refill    ascorbic acid, vitamin C, (VITAMIN C) 1000 MG tablet Take 1 tablet (1,000 mg total) by mouth once daily.      atorvastatin (LIPITOR) 10 MG tablet Take 1 tablet (10 mg total) by mouth once daily. 90 tablet 3    blood sugar diagnostic Strp 1 strip by Misc.(Non-Drug; Combo Route) route 2 (two) times daily before meals. 200 strip 4    blood-glucose meter kit Use as instructed 1 each 0    cholecalciferol, vitamin D3, 125 mcg (5,000 unit) Tab Take 1 tablet (5,000 Units total) by mouth once daily.      dapagliflozin (FARXIGA) 10 mg tablet Take 1 tablet (10 mg total) by mouth once daily. 30 tablet 6    dulaglutide (TRULICITY) 3 mg/0.5 mL pen injector Inject 3 mg into the skin every 7 days. 4 pen 11    ibuprofen (ADVIL,MOTRIN) 600 MG tablet Take 1 tablet (600 mg total) by mouth every 6 (six) hours as needed for Pain. 20 tablet 0    ketoconazole (NIZORAL) 2 % shampoo Apply topically twice a week. 240 mL 0    lancets (LANCETS,ULTRA THIN) Misc 1 application by Misc.(Non-Drug; Combo Route) route 2 (two) times daily before meals. 200 each 4    losartan-hydrochlorothiazide 100-12.5 mg (HYZAAR) 100-12.5 mg Tab Take 1 tablet by mouth once daily. 90 tablet 3    metFORMIN (GLUCOPHAGE) 1000 MG tablet Take 1 tablet (1,000 mg  total) by mouth 2 (two) times daily with meals. 180 tablet 3    multivitamin (THERAGRAN) per tablet Take 1 tablet by mouth once daily.      NIFEdipine (PROCARDIA-XL) 60 MG (OSM) 24 hr tablet Take 1 tablet (60 mg total) by mouth once daily. 30 tablet 11    zinc gluconate 50 mg tablet Take 1 tablet (50 mg total) by mouth once daily.       No current facility-administered medications for this visit.       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    Review of Systems:  ROS:  Constitutional: no fever or chills  Eyes: no visual changes  ENT: no nasal congestion or sore throat  Respiratory: no cough or shortness of breath  Musculoskeletal: no arthralgias or myalgias      OBJECTIVE:     PHYSICAL EXAM:    General:   There is no height or weight on file to calculate BMI.  Patient is alert, awake and oriented to time, place and person. Mood and affect are appropriate.  Patient does not appear to be in any distress, denies any constitutional symptoms and appears stated age.   HEENT: Pupils are equal and round, sclera are not injected. External examination of ears and nose reveals no abnormalities. Cranial nerves II-X are grossly intact  Neck: examination demonstrates painless active range of motion. Spurling's sign is negative  Skin: no rashes, abrasions or open wounds on the affected extremity   Resp: No respiratory distress or audible wheezing   Psych:  normal mood and behavior  CV: 2+ pulses, all extremities warm and well perfused   Left Shoulder   Skin intact  Tenderness: lateral acromial, posterior acromial  Range of motion is painful   ROM: forward flexion 130, external  rotation 40, internal rotation SI joint  Shoulder Strength: biceps 5/5, triceps 5/5, abduction 5/5, adduction 5/5  positive for impingement sign, sensory exam normal, and motor exam normal  Special Tests:    Crossbody test: negative    Neer's positive  Hawkin's positive    Boom's positive  Drop arm negative    IMAGING:  X-rays of the left shoulder, personally reviewed by me, demonstrate mild degenerative changes.  No fracture or dislocation.     ASSESSMENT/PLAN:   37 y.o. year old male with left shoulder rotator cuff tendonitis    Plan: Discussed with the patient at length all the different treatment options available for his left shoulder including anti-inflammatories, acetaminophen, rest, ice, Physical therapy, occasional cortisone injections for temporary relief, and shoulder arthroscopy  - Discussed strict rest, ice, activity modification  - Avoid CSI or NSAIDS- elevated BP yesterday in urgent care.  Needs to follow up with PCP.  A1c 11.6  - Robaxin prescription to pharmacy  - Follow up if symptoms worsen or fail to improve

## 2022-09-14 NOTE — TELEPHONE ENCOUNTER
Pt state BP medication is causing headaches. Pt state is taking Losartan/hctz 100-12.5mg and Nifedipine 60mg a together and taking daily.     Pt checks BP once per day.     LOV 8/17/22

## 2022-09-16 ENCOUNTER — TELEPHONE (OUTPATIENT)
Dept: INTERNAL MEDICINE | Facility: CLINIC | Age: 37
End: 2022-09-16
Payer: COMMERCIAL

## 2022-09-16 NOTE — TELEPHONE ENCOUNTER
Called pt and LVM. Regarding f/u BP readings and inform pt to stop taking Nifedipine and start Amlodipine.

## 2022-10-24 ENCOUNTER — PATIENT MESSAGE (OUTPATIENT)
Dept: ADMINISTRATIVE | Facility: HOSPITAL | Age: 37
End: 2022-10-24
Payer: COMMERCIAL

## 2022-10-24 ENCOUNTER — PATIENT OUTREACH (OUTPATIENT)
Dept: ADMINISTRATIVE | Facility: HOSPITAL | Age: 37
End: 2022-10-24
Payer: COMMERCIAL

## 2022-11-01 ENCOUNTER — PATIENT OUTREACH (OUTPATIENT)
Dept: ADMINISTRATIVE | Facility: HOSPITAL | Age: 37
End: 2022-11-01
Payer: COMMERCIAL

## 2022-11-01 ENCOUNTER — PATIENT MESSAGE (OUTPATIENT)
Dept: ADMINISTRATIVE | Facility: HOSPITAL | Age: 37
End: 2022-11-01
Payer: COMMERCIAL

## 2022-12-09 ENCOUNTER — PATIENT OUTREACH (OUTPATIENT)
Dept: ADMINISTRATIVE | Facility: HOSPITAL | Age: 37
End: 2022-12-09
Payer: COMMERCIAL

## 2022-12-09 ENCOUNTER — PATIENT MESSAGE (OUTPATIENT)
Dept: ADMINISTRATIVE | Facility: HOSPITAL | Age: 37
End: 2022-12-09
Payer: COMMERCIAL

## 2022-12-22 ENCOUNTER — LAB VISIT (OUTPATIENT)
Dept: LAB | Facility: HOSPITAL | Age: 37
End: 2022-12-22
Attending: FAMILY MEDICINE
Payer: COMMERCIAL

## 2022-12-22 ENCOUNTER — OFFICE VISIT (OUTPATIENT)
Dept: INTERNAL MEDICINE | Facility: CLINIC | Age: 37
End: 2022-12-22
Payer: COMMERCIAL

## 2022-12-22 VITALS
WEIGHT: 304.44 LBS | OXYGEN SATURATION: 98 % | TEMPERATURE: 98 F | RESPIRATION RATE: 18 BRPM | DIASTOLIC BLOOD PRESSURE: 102 MMHG | HEIGHT: 75 IN | SYSTOLIC BLOOD PRESSURE: 208 MMHG | BODY MASS INDEX: 37.85 KG/M2 | HEART RATE: 114 BPM

## 2022-12-22 DIAGNOSIS — J01.40 ACUTE PANSINUSITIS, RECURRENCE NOT SPECIFIED: ICD-10-CM

## 2022-12-22 DIAGNOSIS — E11.59 HYPERTENSION ASSOCIATED WITH DIABETES: Primary | ICD-10-CM

## 2022-12-22 DIAGNOSIS — E11.9 TYPE 2 DIABETES MELLITUS WITHOUT COMPLICATION: ICD-10-CM

## 2022-12-22 DIAGNOSIS — I15.2 HYPERTENSION ASSOCIATED WITH DIABETES: Primary | ICD-10-CM

## 2022-12-22 DIAGNOSIS — E11.65 TYPE 2 DIABETES MELLITUS WITH HYPERGLYCEMIA, WITHOUT LONG-TERM CURRENT USE OF INSULIN: ICD-10-CM

## 2022-12-22 PROCEDURE — 3080F DIAST BP >= 90 MM HG: CPT | Mod: CPTII,S$GLB,, | Performed by: NURSE PRACTITIONER

## 2022-12-22 PROCEDURE — 36415 COLL VENOUS BLD VENIPUNCTURE: CPT | Mod: PO | Performed by: FAMILY MEDICINE

## 2022-12-22 PROCEDURE — 99214 OFFICE O/P EST MOD 30 MIN: CPT | Mod: S$GLB,,, | Performed by: NURSE PRACTITIONER

## 2022-12-22 PROCEDURE — 3008F BODY MASS INDEX DOCD: CPT | Mod: CPTII,S$GLB,, | Performed by: NURSE PRACTITIONER

## 2022-12-22 PROCEDURE — 99999 PR PBB SHADOW E&M-EST. PATIENT-LVL V: CPT | Mod: PBBFAC,,, | Performed by: NURSE PRACTITIONER

## 2022-12-22 PROCEDURE — 1160F PR REVIEW ALL MEDS BY PRESCRIBER/CLIN PHARMACIST DOCUMENTED: ICD-10-PCS | Mod: CPTII,S$GLB,, | Performed by: NURSE PRACTITIONER

## 2022-12-22 PROCEDURE — 1159F PR MEDICATION LIST DOCUMENTED IN MEDICAL RECORD: ICD-10-PCS | Mod: CPTII,S$GLB,, | Performed by: NURSE PRACTITIONER

## 2022-12-22 PROCEDURE — 3046F PR MOST RECENT HEMOGLOBIN A1C LEVEL > 9.0%: ICD-10-PCS | Mod: CPTII,S$GLB,, | Performed by: NURSE PRACTITIONER

## 2022-12-22 PROCEDURE — 3080F PR MOST RECENT DIASTOLIC BLOOD PRESSURE >= 90 MM HG: ICD-10-PCS | Mod: CPTII,S$GLB,, | Performed by: NURSE PRACTITIONER

## 2022-12-22 PROCEDURE — 3046F HEMOGLOBIN A1C LEVEL >9.0%: CPT | Mod: CPTII,S$GLB,, | Performed by: NURSE PRACTITIONER

## 2022-12-22 PROCEDURE — 83036 HEMOGLOBIN GLYCOSYLATED A1C: CPT | Performed by: FAMILY MEDICINE

## 2022-12-22 PROCEDURE — 3008F PR BODY MASS INDEX (BMI) DOCUMENTED: ICD-10-PCS | Mod: CPTII,S$GLB,, | Performed by: NURSE PRACTITIONER

## 2022-12-22 PROCEDURE — 3077F SYST BP >= 140 MM HG: CPT | Mod: CPTII,S$GLB,, | Performed by: NURSE PRACTITIONER

## 2022-12-22 PROCEDURE — 99214 PR OFFICE/OUTPT VISIT, EST, LEVL IV, 30-39 MIN: ICD-10-PCS | Mod: S$GLB,,, | Performed by: NURSE PRACTITIONER

## 2022-12-22 PROCEDURE — 1159F MED LIST DOCD IN RCRD: CPT | Mod: CPTII,S$GLB,, | Performed by: NURSE PRACTITIONER

## 2022-12-22 PROCEDURE — 99999 PR PBB SHADOW E&M-EST. PATIENT-LVL V: ICD-10-PCS | Mod: PBBFAC,,, | Performed by: NURSE PRACTITIONER

## 2022-12-22 PROCEDURE — 1160F RVW MEDS BY RX/DR IN RCRD: CPT | Mod: CPTII,S$GLB,, | Performed by: NURSE PRACTITIONER

## 2022-12-22 PROCEDURE — 3077F PR MOST RECENT SYSTOLIC BLOOD PRESSURE >= 140 MM HG: ICD-10-PCS | Mod: CPTII,S$GLB,, | Performed by: NURSE PRACTITIONER

## 2022-12-22 RX ORDER — CARVEDILOL 6.25 MG/1
6.25 TABLET ORAL 2 TIMES DAILY WITH MEALS
Qty: 60 TABLET | Refills: 6 | Status: SHIPPED | OUTPATIENT
Start: 2022-12-22 | End: 2022-12-28 | Stop reason: SDUPTHER

## 2022-12-22 RX ORDER — LEVOCETIRIZINE DIHYDROCHLORIDE 5 MG/1
5 TABLET, FILM COATED ORAL NIGHTLY
Qty: 30 TABLET | Refills: 1 | Status: SHIPPED | OUTPATIENT
Start: 2022-12-22 | End: 2022-12-28 | Stop reason: SDUPTHER

## 2022-12-22 RX ORDER — AMOXICILLIN AND CLAVULANATE POTASSIUM 875; 125 MG/1; MG/1
1 TABLET, FILM COATED ORAL EVERY 12 HOURS
Qty: 14 TABLET | Refills: 0 | Status: SHIPPED | OUTPATIENT
Start: 2022-12-22 | End: 2022-12-29

## 2022-12-22 RX ORDER — AMLODIPINE BESYLATE 10 MG/1
10 TABLET ORAL DAILY
Qty: 30 TABLET | Refills: 11 | Status: SHIPPED | OUTPATIENT
Start: 2022-12-22 | End: 2022-12-28 | Stop reason: SDUPTHER

## 2022-12-22 NOTE — PROGRESS NOTES
Subjective:       Patient ID: Josh Julio is a 37 y.o. male.    Chief Complaint: Follow-up (Possible Sleep apnea and BP check) and Sinusitis      Patient is a 37 y.o. male who traditionally follows with James Pitts MD presenting today for follow up on BP and consideration for evaluation of sleep apnea.     Sleep Apnea  Patient presents with possible obstructive sleep apnea. Patent has a history of symptoms of hypertension. Snoring of severe. Apneic episodes are present.     Hypertension  Patient is here for follow-up of elevated blood pressure. He is not exercising and is not adherent to a low-salt diet. Blood pressure is not well controlled at home. Cardiac symptoms: none. Patient denies chest pain and headache . Cardiovascular risk factors: diabetes mellitus, dyslipidemia, male gender, and obesity (BMI >= 30 kg/m2).   Reports BP at home in the 185/100 range.     Sinus  Patient reports he has been suffering from sinus congestion for about two weeks.     Review of patient's allergies indicates:  No Known Allergies    Medication List with Changes/Refills   New Medications    AMLODIPINE (NORVASC) 10 MG TABLET    Take 1 tablet (10 mg total) by mouth once daily.    AMOXICILLIN-CLAVULANATE 875-125MG (AUGMENTIN) 875-125 MG PER TABLET    Take 1 tablet by mouth every 12 (twelve) hours. for 7 days    CARVEDILOL (COREG) 6.25 MG TABLET    Take 1 tablet (6.25 mg total) by mouth 2 (two) times daily with meals.    LEVOCETIRIZINE (XYZAL) 5 MG TABLET    Take 1 tablet (5 mg total) by mouth every evening.   Current Medications    ASCORBIC ACID, VITAMIN C, (VITAMIN C) 1000 MG TABLET    Take 1 tablet (1,000 mg total) by mouth once daily.    ATORVASTATIN (LIPITOR) 10 MG TABLET    Take 1 tablet (10 mg total) by mouth once daily.    BLOOD SUGAR DIAGNOSTIC STRP    1 strip by Misc.(Non-Drug; Combo Route) route 2 (two) times daily before meals.    BLOOD-GLUCOSE METER KIT    Use as instructed    CHOLECALCIFEROL, VITAMIN D3, 125  MCG (5,000 UNIT) TAB    Take 1 tablet (5,000 Units total) by mouth once daily.    DULAGLUTIDE (TRULICITY) 3 MG/0.5 ML PEN INJECTOR    Inject 3 mg into the skin every 7 days.    HYDROCODONE-ACETAMINOPHEN (NORCO) 5-325 MG PER TABLET    Take 1 tablet by mouth every 6 (six) hours as needed.    IBUPROFEN (ADVIL,MOTRIN) 600 MG TABLET    Take 1 tablet (600 mg total) by mouth every 6 (six) hours as needed for Pain.    KETOCONAZOLE (NIZORAL) 2 % SHAMPOO    Apply topically twice a week.    LANCETS 30 GAUGE MISC    1 application by Misc.(Non-Drug; Combo Route) route 2 (two) times daily before meals.    LOSARTAN-HYDROCHLOROTHIAZIDE 100-12.5 MG (HYZAAR) 100-12.5 MG TAB    Take 1 tablet by mouth once daily.    METFORMIN (GLUCOPHAGE) 1000 MG TABLET    Take 1 tablet (1,000 mg total) by mouth 2 (two) times daily with meals.    MOBIC 15 MG TABLET    Take 15 mg by mouth once daily.    MULTIVITAMIN (THERAGRAN) PER TABLET    Take 1 tablet by mouth once daily.    TRAMADOL (ULTRAM) 50 MG TABLET    TAKE 1 TABLET BY MOUTH EVERY 6 HOURS AS NEEDED FOR 5 DAYS    ZINC GLUCONATE 50 MG TABLET    Take 1 tablet (50 mg total) by mouth once daily.   Discontinued Medications    NIFEDIPINE (PROCARDIA-XL) 60 MG (OSM) 24 HR TABLET    Take 1 tablet (60 mg total) by mouth once daily.     Medical, social and surgical history has been reviewed with the patient.      Review of Systems   Constitutional:  Negative for activity change and unexpected weight change.   HENT:  Negative for hearing loss, rhinorrhea and trouble swallowing.    Eyes:  Negative for discharge and visual disturbance.   Respiratory:  Negative for chest tightness and wheezing.    Cardiovascular:  Negative for chest pain and palpitations.   Gastrointestinal:  Negative for blood in stool, constipation, diarrhea and vomiting.   Endocrine: Negative for polydipsia and polyuria.   Genitourinary:  Negative for difficulty urinating, hematuria and urgency.   Musculoskeletal:  Negative for  "arthralgias, joint swelling and neck pain.   Neurological:  Negative for weakness and headaches.   Psychiatric/Behavioral:  Negative for confusion and dysphoric mood.          Objective:   BP (!) 208/102 (BP Location: Right arm, Patient Position: Sitting, BP Method: Large (Manual))   Pulse (!) 114   Temp 98 °F (36.7 °C) (Temporal)   Resp 18   Ht 6' 3" (1.905 m)   Wt (!) 138.1 kg (304 lb 7.3 oz)   SpO2 98%   BMI 38.05 kg/m²       Physical Exam  Vitals reviewed.   Constitutional:       Appearance: Normal appearance.   HENT:      Head: Normocephalic and atraumatic.      Right Ear: Tympanic membrane is bulging.      Left Ear: Tympanic membrane is bulging.      Nose:      Right Turbinates: Swollen.      Left Turbinates: Swollen.      Right Sinus: No maxillary sinus tenderness or frontal sinus tenderness.      Left Sinus: No maxillary sinus tenderness or frontal sinus tenderness.      Mouth/Throat:      Pharynx: Oropharynx is clear.   Cardiovascular:      Rate and Rhythm: Normal rate and regular rhythm.      Heart sounds: Normal heart sounds. No murmur heard.  Pulmonary:      Effort: Pulmonary effort is normal.      Breath sounds: Normal breath sounds. No wheezing.   Skin:     General: Skin is warm and dry.   Neurological:      Mental Status: He is alert and oriented to person, place, and time.       Last Labs:  Glucose   Date Value Ref Range Status   08/12/2022 192 (H) 70 - 110 mg/dL Final   05/21/2021 227 (H) 70 - 110 mg/dL Final   05/21/2021 227 (H) 70 - 110 mg/dL Final     BUN   Date Value Ref Range Status   08/12/2022 11 6 - 20 mg/dL Final   05/21/2021 13 6 - 20 mg/dL Final   05/21/2021 13 6 - 20 mg/dL Final     Creatinine   Date Value Ref Range Status   08/12/2022 1.0 0.5 - 1.4 mg/dL Final   05/21/2021 1.1 0.5 - 1.4 mg/dL Final   05/21/2021 1.1 0.5 - 1.4 mg/dL Final     Cholesterol   Date Value Ref Range Status   08/12/2022 221 (H) 120 - 199 mg/dL Final     Comment:     The National Cholesterol Education " Program (NCEP) has set the  following guidelines (reference ranges) for Cholesterol:  Optimal.....................<200 mg/dL  Borderline High.............200-239 mg/dL  High........................> or = 240 mg/dL     05/21/2021 206 (H) 120 - 199 mg/dL Final     Comment:     The National Cholesterol Education Program (NCEP) has set the  following guidelines (reference ranges) for Cholesterol:  Optimal.....................<200 mg/dL  Borderline High.............200-239 mg/dL  High........................> or = 240 mg/dL       Hemoglobin A1C   Date Value Ref Range Status   08/12/2022 11.6 (H) 4.0 - 5.6 % Final     Comment:     ADA Screening Guidelines:  5.7-6.4%  Consistent with prediabetes  >or=6.5%  Consistent with diabetes    High levels of fetal hemoglobin interfere with the HbA1C  assay. Heterozygous hemoglobin variants (HbS, HgC, etc)do  not significantly interfere with this assay.   However, presence of multiple variants may affect accuracy.     05/21/2021 12.1 (H) 4.0 - 5.6 % Final     Comment:     ADA Screening Guidelines:  5.7-6.4%  Consistent with prediabetes  >or=6.5%  Consistent with diabetes    High levels of fetal hemoglobin interfere with the HbA1C  assay. Heterozygous hemoglobin variants (HbS, HgC, etc)do  not significantly interfere with this assay.   However, presence of multiple variants may affect accuracy.       Hemoglobin   Date Value Ref Range Status   08/12/2022 14.0 14.0 - 18.0 g/dL Final   05/21/2021 13.4 (L) 14.0 - 18.0 g/dL Final     Hematocrit   Date Value Ref Range Status   08/12/2022 42.5 40.0 - 54.0 % Final   05/21/2021 41.5 40.0 - 54.0 % Final       I have reviewed the following:     Details / Date    [x]   Labs     []   Micro     []   Pathology     []   Imaging     []   Cardiology Procedures     []   Other        Assessment and Plan:     1. Hypertension associated with diabetes    Chronic, uncontrolled but stable, continue current medications and add Coreg BID    - amLODIPine  (NORVASC) 10 MG tablet; Take 1 tablet (10 mg total) by mouth once daily.  Dispense: 30 tablet; Refill: 11  - Ambulatory referral/consult to Sleep Disorders; Future  - carvediloL (COREG) 6.25 MG tablet; Take 1 tablet (6.25 mg total) by mouth 2 (two) times daily with meals.  Dispense: 60 tablet; Refill: 6    2. Acute pansinusitis, recurrence not specified    - levocetirizine (XYZAL) 5 MG tablet; Take 1 tablet (5 mg total) by mouth every evening.  Dispense: 30 tablet; Refill: 1  - amoxicillin-clavulanate 875-125mg (AUGMENTIN) 875-125 mg per tablet; Take 1 tablet by mouth every 12 (twelve) hours. for 7 days  Dispense: 14 tablet; Refill: 0    3. Type 2 diabetes mellitus with hyperglycemia, without long-term current use of insulin  4. BMI 38.0-38.9,adult    Chronic, labs to assess DM stability. Has lost some weight. Continue current medications     - Ambulatory referral/consult to Sleep Disorders; Future

## 2022-12-23 ENCOUNTER — PATIENT MESSAGE (OUTPATIENT)
Dept: INTERNAL MEDICINE | Facility: CLINIC | Age: 37
End: 2022-12-23
Payer: COMMERCIAL

## 2022-12-23 DIAGNOSIS — E11.65 TYPE 2 DIABETES MELLITUS WITH HYPERGLYCEMIA, WITHOUT LONG-TERM CURRENT USE OF INSULIN: Primary | ICD-10-CM

## 2022-12-23 LAB
ESTIMATED AVG GLUCOSE: 315 MG/DL (ref 68–131)
HBA1C MFR BLD: 12.6 % (ref 4–5.6)

## 2022-12-23 NOTE — TELEPHONE ENCOUNTER
Please schedule a repeat CMP and A1c in 3 months.  I have also referred the patient to see Sudarshan Tyson NP for further diabetic treatment.  Thank you.

## 2022-12-27 ENCOUNTER — PATIENT MESSAGE (OUTPATIENT)
Dept: INTERNAL MEDICINE | Facility: CLINIC | Age: 37
End: 2022-12-27
Payer: COMMERCIAL

## 2022-12-28 DIAGNOSIS — E11.65 TYPE 2 DIABETES MELLITUS WITH HYPERGLYCEMIA, WITHOUT LONG-TERM CURRENT USE OF INSULIN: ICD-10-CM

## 2022-12-28 NOTE — TELEPHONE ENCOUNTER
Care Due:                  Date            Visit Type   Department     Provider  --------------------------------------------------------------------------------                                NP -                              PRIMARY      [facility] INTERNAL  Last Visit: 05-      CARE ([facility])   MEDICINE       James Pitts  Next Visit: None Scheduled  None         None Found                                                            Last  Test          Frequency    Reason                     Performed    Due Date  --------------------------------------------------------------------------------    Office Visit  12 months..  empagliflozin............  05- 05-    Health [facility] Embedded Care Gaps. Reference number: [id number]. 12/28/2022   10:20:40 AM CST

## 2022-12-29 NOTE — TELEPHONE ENCOUNTER
I spoke to pt and notified him her have refills on Rx Jardiance.  He can call his preferred pharmacy to have the Rx transferred.  Pt verbalized understanding

## 2023-01-07 ENCOUNTER — PATIENT MESSAGE (OUTPATIENT)
Dept: INTERNAL MEDICINE | Facility: CLINIC | Age: 38
End: 2023-01-07
Payer: COMMERCIAL

## 2023-01-27 ENCOUNTER — TELEPHONE (OUTPATIENT)
Dept: PHARMACY | Facility: CLINIC | Age: 38
End: 2023-01-27
Payer: COMMERCIAL

## 2023-02-03 ENCOUNTER — PATIENT MESSAGE (OUTPATIENT)
Dept: ADMINISTRATIVE | Facility: HOSPITAL | Age: 38
End: 2023-02-03
Payer: COMMERCIAL

## 2023-02-03 ENCOUNTER — PATIENT OUTREACH (OUTPATIENT)
Dept: ADMINISTRATIVE | Facility: HOSPITAL | Age: 38
End: 2023-02-03
Payer: COMMERCIAL

## 2023-02-07 ENCOUNTER — TELEPHONE (OUTPATIENT)
Dept: INTERNAL MEDICINE | Facility: CLINIC | Age: 38
End: 2023-02-07
Payer: COMMERCIAL

## 2023-02-07 DIAGNOSIS — E11.65 TYPE 2 DIABETES MELLITUS WITH HYPERGLYCEMIA, WITHOUT LONG-TERM CURRENT USE OF INSULIN: Primary | ICD-10-CM

## 2023-02-07 RX ORDER — DULAGLUTIDE 1.5 MG/.5ML
1.5 INJECTION, SOLUTION SUBCUTANEOUS
Qty: 4 PEN | Refills: 6 | Status: SHIPPED | OUTPATIENT
Start: 2023-02-07 | End: 2023-03-21 | Stop reason: SDUPTHER

## 2023-02-07 NOTE — TELEPHONE ENCOUNTER
----- Message from Gilbert Rosado, PharmD sent at 2/7/2023  9:31 AM CST -----  Hello pt has been prescribed trulicity 3mg.This is on backorder and [facility] does not have a release date. Is there another strength you are willing to temp change to? Or do you want to tray another med. If so please  send updated rx to [facility]. please

## 2023-03-24 ENCOUNTER — PATIENT OUTREACH (OUTPATIENT)
Dept: ADMINISTRATIVE | Facility: HOSPITAL | Age: 38
End: 2023-03-24
Payer: COMMERCIAL

## 2023-04-14 ENCOUNTER — TELEPHONE (OUTPATIENT)
Dept: INTERNAL MEDICINE | Facility: CLINIC | Age: 38
End: 2023-04-14
Payer: COMMERCIAL

## 2023-04-14 NOTE — TELEPHONE ENCOUNTER
Spoke to pt and informed him that his insurance is only accepted at [facility] so he will contact [facility] for sleep medicine appointment. Pt given information # [phone]    Pt will call back if needing a external referral.

## 2023-04-14 NOTE — TELEPHONE ENCOUNTER
Called back to state he has [employer] with his job and also has [facility] insurance.   Pt encouraged to update insurance on the portal. Pt also given Billing dept # [phone]

## 2023-04-17 ENCOUNTER — TELEPHONE (OUTPATIENT)
Dept: INTERNAL MEDICINE | Facility: CLINIC | Age: 38
End: 2023-04-17
Payer: COMMERCIAL

## 2023-04-17 NOTE — TELEPHONE ENCOUNTER
Pt wanted to advise that he has an upcoming sleep appointment and has changed insurance, provided pt their phone to update.

## 2023-04-17 NOTE — TELEPHONE ENCOUNTER
----- Message from Temitope Jaimes sent at 4/17/2023  2:40 PM CDT -----  Contact: [phone]  Patient called, requested a courtesy call from Dr Pitts's nurse about sleep clinic appointments, Please call and advise. Thank you

## 2023-04-17 NOTE — TELEPHONE ENCOUNTER
----- Message from Amy Moulton sent at 4/17/2023  1:08 PM CDT -----  Who Called: Pt    What is the request in detail: Requesting call back to discuss being scheduled for a sleep study in [place], due to insurance not being accepted. Please advise    Can the clinic reply by [facility]? No    Best Call Back Number: [phone]      Additional Information:

## 2023-05-03 ENCOUNTER — OFFICE VISIT (OUTPATIENT)
Dept: SLEEP MEDICINE | Facility: CLINIC | Age: 38
End: 2023-05-03
Payer: COMMERCIAL

## 2023-05-03 VITALS
BODY MASS INDEX: 38.1 KG/M2 | SYSTOLIC BLOOD PRESSURE: 198 MMHG | DIASTOLIC BLOOD PRESSURE: 156 MMHG | HEIGHT: 75 IN | HEART RATE: 108 BPM | WEIGHT: 306.44 LBS

## 2023-05-03 DIAGNOSIS — I15.2 HYPERTENSION ASSOCIATED WITH DIABETES: ICD-10-CM

## 2023-05-03 DIAGNOSIS — E11.65 TYPE 2 DIABETES MELLITUS WITH HYPERGLYCEMIA, WITHOUT LONG-TERM CURRENT USE OF INSULIN: ICD-10-CM

## 2023-05-03 DIAGNOSIS — E11.59 HYPERTENSION ASSOCIATED WITH DIABETES: ICD-10-CM

## 2023-05-03 PROCEDURE — 99999 PR PBB SHADOW E&M-EST. PATIENT-LVL III: ICD-10-PCS | Mod: PBBFAC,,, | Performed by: INTERNAL MEDICINE

## 2023-05-03 PROCEDURE — 3080F DIAST BP >= 90 MM HG: CPT | Mod: CPTII,S$GLB,, | Performed by: INTERNAL MEDICINE

## 2023-05-03 PROCEDURE — 3077F SYST BP >= 140 MM HG: CPT | Mod: CPTII,S$GLB,, | Performed by: INTERNAL MEDICINE

## 2023-05-03 PROCEDURE — 3077F PR MOST RECENT SYSTOLIC BLOOD PRESSURE >= 140 MM HG: ICD-10-PCS | Mod: CPTII,S$GLB,, | Performed by: INTERNAL MEDICINE

## 2023-05-03 PROCEDURE — 3008F BODY MASS INDEX DOCD: CPT | Mod: CPTII,S$GLB,, | Performed by: INTERNAL MEDICINE

## 2023-05-03 PROCEDURE — 99204 OFFICE O/P NEW MOD 45 MIN: CPT | Mod: S$GLB,,, | Performed by: INTERNAL MEDICINE

## 2023-05-03 PROCEDURE — 99999 PR PBB SHADOW E&M-EST. PATIENT-LVL III: CPT | Mod: PBBFAC,,, | Performed by: INTERNAL MEDICINE

## 2023-05-03 PROCEDURE — 99204 PR OFFICE/OUTPT VISIT, NEW, LEVL IV, 45-59 MIN: ICD-10-PCS | Mod: S$GLB,,, | Performed by: INTERNAL MEDICINE

## 2023-05-03 PROCEDURE — 3008F PR BODY MASS INDEX (BMI) DOCUMENTED: ICD-10-PCS | Mod: CPTII,S$GLB,, | Performed by: INTERNAL MEDICINE

## 2023-05-03 PROCEDURE — 3080F PR MOST RECENT DIASTOLIC BLOOD PRESSURE >= 90 MM HG: ICD-10-PCS | Mod: CPTII,S$GLB,, | Performed by: INTERNAL MEDICINE

## 2023-05-03 PROCEDURE — 1159F MED LIST DOCD IN RCRD: CPT | Mod: CPTII,S$GLB,, | Performed by: INTERNAL MEDICINE

## 2023-05-03 PROCEDURE — 1159F PR MEDICATION LIST DOCUMENTED IN MEDICAL RECORD: ICD-10-PCS | Mod: CPTII,S$GLB,, | Performed by: INTERNAL MEDICINE

## 2023-05-03 NOTE — PROGRESS NOTES
Referred by Alisa Rajput NP     NEW PATIENT VISIT    Josh Julio  is a pleasant 37 y.o. male  with PMH significant for HTN, DM, BMI 35+, vit D who presents for loud snoring, witnessed apneas.    Sleep Clinic New Patient 5/1/2023   What time do you go to bed on a week day? (Give a range) If I have to work about 8 or 9pm. But if I'm off 10 or 11pm   What time do you go to bed on a day off? (Give a range) 10 or 11pm   How long does it take you to fall asleep? (Give a range) Not long   On average, how many times per night do you wake up? 4   How long does it take you to fall back into sleep? (Give a range) Not long   What time do you wake up to start your day on a week day? (Give a range) If I have to work 2:50am...but if off 7am   What time do you wake up to start your day on a day off? (Give a range) 9am   What time do you get out of bed? (Give a range) Depends   On average, how many hours do you sleep? I don't know   On average, how many naps do you take per day? 1   Rate your sleep quality from 0 to 5 (0-poor, 5-great). 2   Have you experienced:  Weight gain?   How much weight have you lost or gained (in lbs.) in the last year? 10   On average, how many times per night do you go to the bathroom?  4 or 5   Have you ever had a sleep study/CPAP machine/surgery for sleep apnea? No   Have you ever had a CPAP machine for sleep apnea? No   Have you ever had surgery for sleep apnea? No     Sleep Clinic ROS  5/1/2023   Difficulty breathing through the nose?  Yes   Sore throat or dry mouth in the morning? Yes   Irregular or very fast heart beat?  Yes   Shortness of breath?  Yes   Acid reflux? Sometimes   Body aches and pains?  Yes   Morning headaches? Sometimes   Dizziness? Sometimes   Mood changes?  Sometimes   Do you exercise?  No   Do you feel like moving your legs a lot?  Sometimes       Past Medical History:   Diagnosis Date    Benign essential hypertension 10/21/2020    BMI 38.0-38.9,adult 10/21/2020     Essential hypertension     HCV antibody positive 10/26/2020    Mixed hyperlipidemia 10/24/2020    Type 2 diabetes mellitus without complication, without long-term current use of insulin 10/21/2020    Vitamin D insufficiency 10/24/2020     Patient Active Problem List   Diagnosis    BMI 38.0-38.9,adult    Type 2 diabetes mellitus with hyperglycemia, without long-term current use of insulin    Vitamin D insufficiency    Hyperlipidemia associated with type 2 diabetes mellitus    Hypertension associated with diabetes       Current Outpatient Medications:     amLODIPine (NORVASC) 10 MG tablet, Take 1 tablet (10 mg total) by mouth once daily., Disp: 30 tablet, Rfl: 11    atorvastatin (LIPITOR) 10 MG tablet, Take 1 tablet (10 mg total) by mouth once daily., Disp: 90 tablet, Rfl: 3    blood sugar diagnostic Strp, 1 strip by Misc.(Non-Drug; Combo Route) route 2 (two) times daily before meals., Disp: 200 strip, Rfl: 4    carvediloL (COREG) 6.25 MG tablet, Take 1 tablet (6.25 mg total) by mouth 2 (two) times daily with meals., Disp: 60 tablet, Rfl: 6    dulaglutide (TRULICITY) 3 mg/0.5 mL pen injector, Inject 3 mg into the skin every 7 days., Disp: 4 pen, Rfl: 11    empagliflozin (JARDIANCE) 10 mg tablet, Take 1 tablet (10 mg total) by mouth once daily., Disp: 30 tablet, Rfl: 3    lancets 30 gauge Misc, 1 application by Misc.(Non-Drug; Combo Route) route 2 (two) times daily before meals., Disp: 200 each, Rfl: 4    losartan-hydrochlorothiazide 100-12.5 mg (HYZAAR) 100-12.5 mg Tab, Take 1 tablet by mouth once daily., Disp: 90 tablet, Rfl: 3    metFORMIN (GLUCOPHAGE) 1000 MG tablet, Take 1 tablet (1,000 mg total) by mouth 2 (two) times daily with meals., Disp: 180 tablet, Rfl: 3    blood-glucose meter kit, Use as instructed, Disp: 1 each, Rfl: 0    dulaglutide (TRULICITY) 1.5 mg/0.5 mL pen injector, Inject 1.5 mg into the skin every 7 days., Disp: 4 pen, Rfl: 6    ketoconazole (NIZORAL) 2 % shampoo, Apply topically twice a week.,  "Disp: 240 mL, Rfl: 0    multivitamin (THERAGRAN) per tablet, Take 1 tablet by mouth once daily., Disp: , Rfl:        Vitals:    05/03/23 1259   BP: (!) 198/156   BP Location: Right arm   Patient Position: Sitting   BP Method: Medium (Automatic)   Pulse: 108   Weight: (!) 139 kg (306 lb 7 oz)   Height: 6' 3" (1.905 m)     Physical Exam:    GEN:   Well-appearing  Psych:  Appropriate affect, demonstrates insight  SKIN:  No rash on the face or bridge of the nose      LABS:   No results found for: HGB, CO2    RECORDS REVIEWED PREVIOUSLY:    No prior sleep testing.    ASSESSMENT    PROBLEM DESCRIPTION/ Sx on Presentation  STATUS   suspected SOPHIA   + snoring, +snoring arousals, + witnessed apneas     New   Daytime Sx   + sleepiness when inactive if inactive at work  ESS 7/24 on intake  New   Nocturia   x 4 per sleep period  New   Other issues:     PLAN     -recommend sleep testing   -discussed trial therapy if SOPHIA present and the patient is  open to a trial of CPAP therapy    RTC          The patient was given open opportunity to ask questions and/or express concerns about treatment plan.   All questions/concerns were discussed.     Two patient identifiers used prior to evaluation.           "

## 2023-05-09 ENCOUNTER — TELEPHONE (OUTPATIENT)
Dept: SLEEP MEDICINE | Facility: OTHER | Age: 38
End: 2023-05-09
Payer: COMMERCIAL

## 2023-05-11 ENCOUNTER — HOSPITAL ENCOUNTER (OUTPATIENT)
Dept: SLEEP MEDICINE | Facility: OTHER | Age: 38
Discharge: HOME | End: 2023-05-11
Attending: INTERNAL MEDICINE
Payer: COMMERCIAL

## 2023-05-11 DIAGNOSIS — E11.59 HYPERTENSION ASSOCIATED WITH DIABETES: ICD-10-CM

## 2023-05-11 DIAGNOSIS — E11.65 TYPE 2 DIABETES MELLITUS WITH HYPERGLYCEMIA, WITHOUT LONG-TERM CURRENT USE OF INSULIN: ICD-10-CM

## 2023-05-11 DIAGNOSIS — I15.2 HYPERTENSION ASSOCIATED WITH DIABETES: ICD-10-CM

## 2023-05-11 PROCEDURE — 95800 SLP STDY UNATTENDED: CPT

## 2023-05-15 PROCEDURE — 95806 SLEEP STUDY UNATT&RESP EFFT: CPT | Mod: 26,,, | Performed by: INTERNAL MEDICINE

## 2023-05-15 PROCEDURE — 95806 PR SLEEP STUDY, UNATTENDED, SIMUL RECORD HR/O2 SAT/RESP FLOW/RESP EFFT: ICD-10-PCS | Mod: 26,,, | Performed by: INTERNAL MEDICINE

## 2023-05-16 ENCOUNTER — PATIENT MESSAGE (OUTPATIENT)
Dept: SLEEP MEDICINE | Facility: CLINIC | Age: 38
End: 2023-05-16
Payer: COMMERCIAL

## 2023-05-16 DIAGNOSIS — G47.33 OSA (OBSTRUCTIVE SLEEP APNEA): Primary | ICD-10-CM

## 2023-05-16 NOTE — PROCEDURES
HST completed and interpreted.  Report is available under media tab.   Please contact Dr. Eduardo [place] or place referral to Sleep Medicine if a formal consult might be of benefit.

## 2023-07-03 ENCOUNTER — PATIENT OUTREACH (OUTPATIENT)
Dept: ADMINISTRATIVE | Facility: HOSPITAL | Age: 38
End: 2023-07-03
Payer: COMMERCIAL

## 2023-07-03 NOTE — PROGRESS NOTES
Health Maintenance Due   Topic Date Due    Eye Exam  Never done    COVID-19 Vaccine (4 - Moderna series) 02/24/2022    Hemoglobin A1c  03/22/2023    Foot Exam  08/17/2023     Chart reviewed.   Immunizations: Reconciled  Orders placed: N/A  Upcoming appts to satisfy IVANA topics: N/A

## 2023-07-30 DIAGNOSIS — E11.65 TYPE 2 DIABETES MELLITUS WITH HYPERGLYCEMIA, WITHOUT LONG-TERM CURRENT USE OF INSULIN: ICD-10-CM

## 2023-07-30 NOTE — TELEPHONE ENCOUNTER
Care Due:                  Date            Visit Type   Department     Provider  --------------------------------------------------------------------------------    Last Visit: None Found      None         None Found                              EP -                              PRIMARY      [facility] INTERNAL  Next Visit: 08-      CARE ([facility])   MEDICINE       James Pitts                                                            Last  Test          Frequency    Reason                     Performed    Due Date  --------------------------------------------------------------------------------    Cr..........  12 months..  empagliflozin............  08- 08-    HBA1C.......  6 months...  empagliflozin............  12- 06-    Health [facility] Embedded Care Due Messages. Reference number: [id number].   7/30/2023 10:15:33 AM CDT

## 2023-07-31 NOTE — TELEPHONE ENCOUNTER
Nov 8/11/23  Lov  12/22/22 michelle.  (Lov imsais 5/21/21 annual)    Last filled by [facility] 30 x 3  1/25/23

## 2023-08-03 ENCOUNTER — PATIENT OUTREACH (OUTPATIENT)
Dept: ADMINISTRATIVE | Facility: HOSPITAL | Age: 38
End: 2023-08-03
Payer: COMMERCIAL

## 2023-08-03 DIAGNOSIS — E78.5 HYPERLIPIDEMIA ASSOCIATED WITH TYPE 2 DIABETES MELLITUS: Primary | ICD-10-CM

## 2023-08-03 DIAGNOSIS — E11.65 TYPE 2 DIABETES MELLITUS WITH HYPERGLYCEMIA, WITHOUT LONG-TERM CURRENT USE OF INSULIN: ICD-10-CM

## 2023-08-03 DIAGNOSIS — E11.69 HYPERLIPIDEMIA ASSOCIATED WITH TYPE 2 DIABETES MELLITUS: Primary | ICD-10-CM

## 2023-08-03 NOTE — PROGRESS NOTES
Health Maintenance Due   Topic Date Due    Eye Exam  Never done    COVID-19 Vaccine (4 - Moderna series) 02/24/2022    Diabetes Urine Screening  05/21/2022    Hemoglobin A1c  03/22/2023    Lipid Panel  08/12/2023    Foot Exam  08/17/2023     Chart reviewed.   Immunizations: Reconciled  Orders placed: Diabetic urine screening, Lipid panel  Upcoming appts to satisfy IVANA topics: N/A

## 2023-08-24 ENCOUNTER — OFFICE VISIT (OUTPATIENT)
Dept: INTERNAL MEDICINE | Facility: CLINIC | Age: 38
End: 2023-08-24
Payer: COMMERCIAL

## 2023-08-24 ENCOUNTER — TELEPHONE (OUTPATIENT)
Dept: INTERNAL MEDICINE | Facility: CLINIC | Age: 38
End: 2023-08-24
Payer: COMMERCIAL

## 2023-08-24 VITALS
SYSTOLIC BLOOD PRESSURE: 116 MMHG | HEIGHT: 75 IN | OXYGEN SATURATION: 97 % | BODY MASS INDEX: 35.86 KG/M2 | WEIGHT: 288.38 LBS | HEART RATE: 110 BPM | DIASTOLIC BLOOD PRESSURE: 68 MMHG

## 2023-08-24 DIAGNOSIS — E11.59 HYPERTENSION ASSOCIATED WITH DIABETES: ICD-10-CM

## 2023-08-24 DIAGNOSIS — K85.30 DRUG-INDUCED ACUTE PANCREATITIS WITHOUT INFECTION OR NECROSIS: Primary | ICD-10-CM

## 2023-08-24 DIAGNOSIS — I15.2 HYPERTENSION ASSOCIATED WITH DIABETES: ICD-10-CM

## 2023-08-24 DIAGNOSIS — E11.65 TYPE 2 DIABETES MELLITUS WITH HYPERGLYCEMIA, WITHOUT LONG-TERM CURRENT USE OF INSULIN: ICD-10-CM

## 2023-08-24 PROCEDURE — 1159F PR MEDICATION LIST DOCUMENTED IN MEDICAL RECORD: ICD-10-PCS | Mod: CPTII,S$GLB,, | Performed by: INTERNAL MEDICINE

## 2023-08-24 PROCEDURE — 3074F SYST BP LT 130 MM HG: CPT | Mod: CPTII,S$GLB,, | Performed by: INTERNAL MEDICINE

## 2023-08-24 PROCEDURE — 99999 PR PBB SHADOW E&M-EST. PATIENT-LVL III: ICD-10-PCS | Mod: PBBFAC,,, | Performed by: INTERNAL MEDICINE

## 2023-08-24 PROCEDURE — 3078F DIAST BP <80 MM HG: CPT | Mod: CPTII,S$GLB,, | Performed by: INTERNAL MEDICINE

## 2023-08-24 PROCEDURE — 3008F BODY MASS INDEX DOCD: CPT | Mod: CPTII,S$GLB,, | Performed by: INTERNAL MEDICINE

## 2023-08-24 PROCEDURE — 3008F PR BODY MASS INDEX (BMI) DOCUMENTED: ICD-10-PCS | Mod: CPTII,S$GLB,, | Performed by: INTERNAL MEDICINE

## 2023-08-24 PROCEDURE — 1159F MED LIST DOCD IN RCRD: CPT | Mod: CPTII,S$GLB,, | Performed by: INTERNAL MEDICINE

## 2023-08-24 PROCEDURE — 99999 PR PBB SHADOW E&M-EST. PATIENT-LVL III: CPT | Mod: PBBFAC,,, | Performed by: INTERNAL MEDICINE

## 2023-08-24 PROCEDURE — 3074F PR MOST RECENT SYSTOLIC BLOOD PRESSURE < 130 MM HG: ICD-10-PCS | Mod: CPTII,S$GLB,, | Performed by: INTERNAL MEDICINE

## 2023-08-24 PROCEDURE — 99214 PR OFFICE/OUTPT VISIT, EST, LEVL IV, 30-39 MIN: ICD-10-PCS | Mod: S$GLB,,, | Performed by: INTERNAL MEDICINE

## 2023-08-24 PROCEDURE — 3078F PR MOST RECENT DIASTOLIC BLOOD PRESSURE < 80 MM HG: ICD-10-PCS | Mod: CPTII,S$GLB,, | Performed by: INTERNAL MEDICINE

## 2023-08-24 PROCEDURE — 99214 OFFICE O/P EST MOD 30 MIN: CPT | Mod: S$GLB,,, | Performed by: INTERNAL MEDICINE

## 2023-08-24 NOTE — PROGRESS NOTES
INTERNAL MEDICINE CLINIC - SAME DAY APPOINTMENT  Progress Note    PRESENTING HISTORY     PCP: James Pitts MD    Chief Complaint/Reason for Visit:     Chief Complaint   Patient presents with    ED Follow up     History of Present Illness & ROS : Mr. Josh Julio is a 38 y.o. male.      Was at [facility] 8/22/2023 ED:    He has abdominal pain.  He had CT abdomen.  Was told he had pancreatitis.  Was told to stop Trulicity.    Patient will need bring official record since [facility] does sync with Epic.     Blood sugar 197.       Review of Systems:  Answers submitted by the patient for this visit:  High Blood Pressure Questionnaire (Submitted on 8/23/2023)  Chief Complaint: Hypertension  Chronicity: recurrent  Onset: more than 1 year ago  Progression since onset: unchanged  Condition status: uncontrolled  anxiety: No  blurred vision: No  chest pain: No  headaches: No  malaise/fatigue: No  neck pain: No  orthopnea: No  palpitations: No  peripheral edema: No  PND: No  shortness of breath: No  sweats: No  Agents associated with hypertension: no associated agents  CAD risks: diabetes mellitus, dyslipidemia, obesity, stress  Compliance problems: diet, exercise  Past treatments: nothing  Improvement on treatment: no improvement      PAST HISTORY:     Past Medical History:   Diagnosis Date    Benign essential hypertension 10/21/2020    BMI 38.0-38.9,adult 10/21/2020    Essential hypertension     HCV antibody positive 10/26/2020    Mixed hyperlipidemia 10/24/2020    Type 2 diabetes mellitus without complication, without long-term current use of insulin 10/21/2020    Vitamin D insufficiency 10/24/2020       No past surgical history on file.    Family History   Problem Relation Age of Onset    Hypertension Mother     No Known Problems Father     Diabetes Maternal Grandmother     Hypertension Maternal Grandmother     Kidney disease Maternal Grandmother     Hypertension Brother        Social History     Socioeconomic History     Marital status: Single    Number of children: 3   Tobacco Use    Smoking status: Never    Smokeless tobacco: Never   Substance and Sexual Activity    Alcohol use: Yes     Comment: social    Drug use: No    Sexual activity: Yes     Partners: Female   Social History Narrative    Maintenance for [employer]    Engaged.    3 Kids from previous relationship.     Social Determinants of Health     Financial Resource Strain: Low Risk  (8/17/2022)    Overall Financial Resource Strain (CARDIA)     Difficulty of Paying Living Expenses: Not hard at all   Food Insecurity: Food Insecurity Present (8/17/2022)    Hunger Vital Sign     Worried About Running Out of Food in the Last Year: Sometimes true     Ran Out of Food in the Last Year: Never true   Transportation Needs: Unknown (8/17/2022)    PRAPARE - Transportation     Lack of Transportation (Medical): No   Social Connections: Unknown (8/17/2022)    Social Connection and Isolation Panel [NHANES]     Frequency of Communication with Friends and Family: Once a week     Frequency of Social Gatherings with Friends and Family: Once a week     Active Member of Clubs or Organizations: No     Attends Club or Organization Meetings: Never     Marital Status:        MEDICATIONS & ALLERGIES:     Current Outpatient Medications on File Prior to Visit   Medication Sig Dispense Refill    amLODIPine (NORVASC) 10 MG tablet Take 1 tablet (10 mg total) by mouth once daily. 30 tablet 11    atorvastatin (LIPITOR) 10 MG tablet Take 1 tablet (10 mg total) by mouth once daily. 90 tablet 3    blood sugar diagnostic Strp 1 strip by Misc.(Non-Drug; Combo Route) route 2 (two) times daily before meals. 200 strip 4    carvediloL (COREG) 6.25 MG tablet Take 1 tablet (6.25 mg total) by mouth 2 (two) times daily with meals. 60 tablet 6    empagliflozin (JARDIANCE) 10 mg tablet Take 1 tablet (10 mg total) by mouth once daily. 30 tablet 3    lancets 30 gauge Misc 1 application by Misc.(Non-Drug;  Combo Route) route 2 (two) times daily before meals. 200 each 4    losartan-hydrochlorothiazide 100-12.5 mg (HYZAAR) 100-12.5 mg Tab Take 1 tablet by mouth once daily. 90 tablet 3    metFORMIN (GLUCOPHAGE) 1000 MG tablet Take 1 tablet (1,000 mg total) by mouth 2 (two) times daily with meals. 180 tablet 3     Review of patient's allergies indicates:   Allergen Reactions    Trulicity [dulaglutide] Other (See Comments)     Acute pancreatitis at [facility] ED 8-       Medications Reconciliation:   I have reconciled the patient's home medications with the patient/family. I have updated all changes.  Refer to After-Visit Medication List.    OBJECTIVE:     Vital Signs:  Vitals:    08/24/23 1805   BP: 116/68   Pulse: 110     Wt Readings from Last 3 Encounters:   08/24/23 1805 130.8 kg (288 lb 5.8 oz)   05/03/23 1259 (!) 139 kg (306 lb 7 oz)   12/22/22 1103 (!) 138.1 kg (304 lb 7.3 oz)     Body mass index is 36.04 kg/m².     Physical Exam:  General: Well developed, well nourished. No distress.  HEENT: Head is normocephalic, atraumatic  Eyes: Clear conjunctiva.  Neck: Supple, symmetrical neck; trachea midline.  Lungs: Clear to auscultation bilaterally and normal respiratory effort.  Cardiovascular: Heart with regular rate and rhythm.    Extremities: No LE edema.   Abdomen: Abdomen is soft, non-tender non-distended with normal bowel sounds.  Skin: Skin color, texture, turgor normal. No rashes.  Musculoskeletal: Normal gait.   Psychiatric: Normal affect. Alert.    Laboratory  Lab Results   Component Value Date    WBC 5.40 08/12/2022    HGB 14.0 08/12/2022    HCT 42.5 08/12/2022     08/12/2022    CHOL 221 (H) 08/12/2022    TRIG 283 (H) 08/12/2022    HDL 46 08/12/2022    ALT 31 08/12/2022    AST 24 08/12/2022     08/12/2022    K 4.7 08/12/2022     08/12/2022    CREATININE 1.0 08/12/2022    BUN 11 08/12/2022    CO2 26 08/12/2022    TSH 1.680 08/12/2022    HGBA1C 12.6 (H) 12/22/2022       ASSESSMENT & PLAN:      Drug-induced acute pancreatitis without infection or necrosis  - Likely due to Trulicity.    - Current without abdominal pain. Patient will get record from [facility].    Hypertension associated with diabetes  - BP under control on treatment.    Type 2 diabetes mellitus with hyperglycemia, without long-term current use of insulin  - Uncontrolled.  Unable to use Trulicity.    Will increase Jardiance from 10 to 25 mg daily.  Continue metformin 1000 mg BID.    -     empagliflozin (JARDIANCE) 25 mg tablet; Take 1 tablet (25 mg total) by mouth once daily.  Dispense: 30 tablet; Refill: 2      Scheduled Follow-up :  Future Appointments   Date Time Provider Department Center          9/25/2023  9:30 AM James Pitts MD [facility] IM [place]       After Visit Medication List :     Medication List            Accurate as of August 24, 2023  6:24 PM. If you have any questions, ask your nurse or doctor.                CHANGE how you take these medications      empagliflozin 25 mg tablet  Commonly known as: JARDIANCE  Take 1 tablet (25 mg total) by mouth once daily.  What changed:   medication strength  how much to take  Changed by: Rodri Armstrong MD            CONTINUE taking these medications      amLODIPine 10 MG tablet  Commonly known as: NORVASC  Take 1 tablet (10 mg total) by mouth once daily.     atorvastatin 10 MG tablet  Commonly known as: LIPITOR  Take 1 tablet (10 mg total) by mouth once daily.     carvediloL 6.25 MG tablet  Commonly known as: COREG  Take 1 tablet (6.25 mg total) by mouth 2 (two) times daily with meals.     EASY TOUCH TWIST LANCETS 30 gauge Misc  Generic drug: lancets  1 application by Misc.(Non-Drug; Combo Route) route 2 (two) times daily before meals.     losartan-hydrochlorothiazide 100-12.5 mg 100-12.5 mg Tab  Commonly known as: HYZAAR  Take 1 tablet by mouth once daily.     metFORMIN 1000 MG tablet  Commonly known as: GLUCOPHAGE  Take 1 tablet (1,000 mg total) by mouth 2 (two) times daily with  meals.     TRUE METRIX GLUCOSE TEST STRIP Strp  Generic drug: blood sugar diagnostic  1 strip by Misc.(Non-Drug; Combo Route) route 2 (two) times daily before meals.            STOP taking these medications      TRULICITY 3 mg/0.5 mL pen injector  Generic drug: dulaglutide  Stopped by: Rodri Armstrong MD               Where to Get Your Medications        These medications were sent to [facility]  [address], [place] [address]      Hours: Mon-Fri, 8a-5:30p Phone: [phone]   empagliflozin 25 mg tablet         Signing Physician:  Rodri Armstrong MD

## 2023-08-24 NOTE — TELEPHONE ENCOUNTER
Tried to call pt to verify whether or not he was gonna be making it this evening, also to let him know if he wanted to come in a little earlier that he can.

## 2023-08-25 ENCOUNTER — TELEPHONE (OUTPATIENT)
Dept: INTERNAL MEDICINE | Facility: CLINIC | Age: 38
End: 2023-08-25
Payer: COMMERCIAL

## 2023-08-25 DIAGNOSIS — I15.2 HYPERTENSION ASSOCIATED WITH DIABETES: Primary | ICD-10-CM

## 2023-08-25 DIAGNOSIS — Z00.00 ENCOUNTER FOR ANNUAL HEALTH EXAMINATION: ICD-10-CM

## 2023-08-25 DIAGNOSIS — E11.69 HYPERLIPIDEMIA ASSOCIATED WITH TYPE 2 DIABETES MELLITUS: ICD-10-CM

## 2023-08-25 DIAGNOSIS — E11.59 HYPERTENSION ASSOCIATED WITH DIABETES: Primary | ICD-10-CM

## 2023-08-25 DIAGNOSIS — E11.65 TYPE 2 DIABETES MELLITUS WITH HYPERGLYCEMIA, WITHOUT LONG-TERM CURRENT USE OF INSULIN: ICD-10-CM

## 2023-08-25 DIAGNOSIS — E78.5 HYPERLIPIDEMIA ASSOCIATED WITH TYPE 2 DIABETES MELLITUS: ICD-10-CM

## 2023-08-25 NOTE — TELEPHONE ENCOUNTER
I have not seen this patient since 2021.  No orders will be placed until the patient is seen.  He is scheduled to be seen in September.  Digital medicine referral be placed at that time.  Please inform the patient.  Thank you.

## 2023-08-25 NOTE — TELEPHONE ENCOUNTER
----- Message from Vivian Julio sent at 8/25/2023  2:02 PM CDT -----  Contact: Pt [phone]  Pt called in regards to getting back with the digital b/p monitoring he states he will need Dr Pitts to put in referral again and he needs a new machine please advise

## 2023-08-28 ENCOUNTER — LAB VISIT (OUTPATIENT)
Dept: LAB | Facility: HOSPITAL | Age: 38
End: 2023-08-28
Attending: FAMILY MEDICINE
Payer: COMMERCIAL

## 2023-08-28 DIAGNOSIS — I15.2 HYPERTENSION ASSOCIATED WITH DIABETES: ICD-10-CM

## 2023-08-28 DIAGNOSIS — E11.59 HYPERTENSION ASSOCIATED WITH DIABETES: ICD-10-CM

## 2023-08-28 DIAGNOSIS — E11.69 HYPERLIPIDEMIA ASSOCIATED WITH TYPE 2 DIABETES MELLITUS: ICD-10-CM

## 2023-08-28 DIAGNOSIS — E11.65 TYPE 2 DIABETES MELLITUS WITH HYPERGLYCEMIA, WITHOUT LONG-TERM CURRENT USE OF INSULIN: ICD-10-CM

## 2023-08-28 DIAGNOSIS — E78.5 HYPERLIPIDEMIA ASSOCIATED WITH TYPE 2 DIABETES MELLITUS: ICD-10-CM

## 2023-08-28 DIAGNOSIS — Z00.00 ENCOUNTER FOR ANNUAL HEALTH EXAMINATION: ICD-10-CM

## 2023-08-28 LAB
ALBUMIN SERPL BCP-MCNC: 4.2 G/DL (ref 3.5–5.2)
ALP SERPL-CCNC: 99 U/L (ref 55–135)
ALT SERPL W/O P-5'-P-CCNC: 26 U/L (ref 10–44)
ANION GAP SERPL CALC-SCNC: 12 MMOL/L (ref 8–16)
AST SERPL-CCNC: 20 U/L (ref 10–40)
BASOPHILS # BLD AUTO: 0.07 K/UL (ref 0–0.2)
BASOPHILS NFR BLD: 1.1 % (ref 0–1.9)
BILIRUB SERPL-MCNC: 0.5 MG/DL (ref 0.1–1)
BUN SERPL-MCNC: 16 MG/DL (ref 6–20)
CALCIUM SERPL-MCNC: 10 MG/DL (ref 8.7–10.5)
CHLORIDE SERPL-SCNC: 100 MMOL/L (ref 95–110)
CHOLEST SERPL-MCNC: 103 MG/DL (ref 120–199)
CHOLEST/HDLC SERPL: 3.3 {RATIO} (ref 2–5)
CO2 SERPL-SCNC: 25 MMOL/L (ref 23–29)
COMPLEXED PSA SERPL-MCNC: 0.21 NG/ML (ref 0–4)
CREAT SERPL-MCNC: 1.3 MG/DL (ref 0.5–1.4)
DIFFERENTIAL METHOD: ABNORMAL
EOSINOPHIL # BLD AUTO: 0.2 K/UL (ref 0–0.5)
EOSINOPHIL NFR BLD: 2.7 % (ref 0–8)
ERYTHROCYTE [DISTWIDTH] IN BLOOD BY AUTOMATED COUNT: 14.7 % (ref 11.5–14.5)
EST. GFR  (NO RACE VARIABLE): >60 ML/MIN/1.73 M^2
ESTIMATED AVG GLUCOSE: 226 MG/DL (ref 68–131)
GLUCOSE SERPL-MCNC: 151 MG/DL (ref 70–110)
HBA1C MFR BLD: 9.5 % (ref 4–5.6)
HCT VFR BLD AUTO: 41.1 % (ref 40–54)
HDLC SERPL-MCNC: 31 MG/DL (ref 40–75)
HDLC SERPL: 30.1 % (ref 20–50)
HGB BLD-MCNC: 13.2 G/DL (ref 14–18)
IMM GRANULOCYTES # BLD AUTO: 0.01 K/UL (ref 0–0.04)
IMM GRANULOCYTES NFR BLD AUTO: 0.2 % (ref 0–0.5)
LDLC SERPL CALC-MCNC: 44 MG/DL (ref 63–159)
LYMPHOCYTES # BLD AUTO: 2.6 K/UL (ref 1–4.8)
LYMPHOCYTES NFR BLD: 42 % (ref 18–48)
MCH RBC QN AUTO: 24.7 PG (ref 27–31)
MCHC RBC AUTO-ENTMCNC: 32.1 G/DL (ref 32–36)
MCV RBC AUTO: 77 FL (ref 82–98)
MONOCYTES # BLD AUTO: 0.7 K/UL (ref 0.3–1)
MONOCYTES NFR BLD: 11.4 % (ref 4–15)
NEUTROPHILS # BLD AUTO: 2.7 K/UL (ref 1.8–7.7)
NEUTROPHILS NFR BLD: 42.6 % (ref 38–73)
NONHDLC SERPL-MCNC: 72 MG/DL
NRBC BLD-RTO: 0 /100 WBC
PLATELET # BLD AUTO: 328 K/UL (ref 150–450)
PMV BLD AUTO: 10.9 FL (ref 9.2–12.9)
POTASSIUM SERPL-SCNC: 4.1 MMOL/L (ref 3.5–5.1)
PROT SERPL-MCNC: 8 G/DL (ref 6–8.4)
RBC # BLD AUTO: 5.34 M/UL (ref 4.6–6.2)
SODIUM SERPL-SCNC: 137 MMOL/L (ref 136–145)
T4 FREE SERPL-MCNC: 1.1 NG/DL (ref 0.71–1.51)
TRIGL SERPL-MCNC: 140 MG/DL (ref 30–150)
TSH SERPL DL<=0.005 MIU/L-ACNC: 6.93 UIU/ML (ref 0.4–4)
WBC # BLD AUTO: 6.29 K/UL (ref 3.9–12.7)

## 2023-08-28 PROCEDURE — 83036 HEMOGLOBIN GLYCOSYLATED A1C: CPT | Performed by: FAMILY MEDICINE

## 2023-08-28 PROCEDURE — 84443 ASSAY THYROID STIM HORMONE: CPT | Performed by: FAMILY MEDICINE

## 2023-08-28 PROCEDURE — 85025 COMPLETE CBC W/AUTO DIFF WBC: CPT | Performed by: FAMILY MEDICINE

## 2023-08-28 PROCEDURE — 84153 ASSAY OF PSA TOTAL: CPT | Performed by: FAMILY MEDICINE

## 2023-08-28 PROCEDURE — 84439 ASSAY OF FREE THYROXINE: CPT | Performed by: FAMILY MEDICINE

## 2023-08-28 PROCEDURE — 80053 COMPREHEN METABOLIC PANEL: CPT | Performed by: FAMILY MEDICINE

## 2023-08-28 PROCEDURE — 36415 COLL VENOUS BLD VENIPUNCTURE: CPT | Mod: PO | Performed by: FAMILY MEDICINE

## 2023-08-28 PROCEDURE — 80061 LIPID PANEL: CPT | Performed by: FAMILY MEDICINE

## 2023-08-31 DIAGNOSIS — E78.5 HYPERLIPIDEMIA ASSOCIATED WITH TYPE 2 DIABETES MELLITUS: ICD-10-CM

## 2023-08-31 DIAGNOSIS — E11.65 TYPE 2 DIABETES MELLITUS WITH HYPERGLYCEMIA, WITHOUT LONG-TERM CURRENT USE OF INSULIN: ICD-10-CM

## 2023-08-31 DIAGNOSIS — I15.2 HYPERTENSION ASSOCIATED WITH DIABETES: ICD-10-CM

## 2023-08-31 DIAGNOSIS — E11.69 HYPERLIPIDEMIA ASSOCIATED WITH TYPE 2 DIABETES MELLITUS: ICD-10-CM

## 2023-08-31 DIAGNOSIS — E11.59 HYPERTENSION ASSOCIATED WITH DIABETES: ICD-10-CM

## 2023-08-31 RX ORDER — ATORVASTATIN CALCIUM 10 MG/1
10 TABLET, FILM COATED ORAL DAILY
Qty: 30 TABLET | Refills: 0 | Status: SHIPPED | OUTPATIENT
Start: 2023-08-31 | End: 2023-09-06 | Stop reason: SDUPTHER

## 2023-08-31 RX ORDER — METFORMIN HYDROCHLORIDE 1000 MG/1
1000 TABLET ORAL 2 TIMES DAILY WITH MEALS
Qty: 180 TABLET | Refills: 3 | Status: CANCELLED | OUTPATIENT
Start: 2023-08-31

## 2023-08-31 RX ORDER — LOSARTAN POTASSIUM AND HYDROCHLOROTHIAZIDE 12.5; 1 MG/1; MG/1
1 TABLET ORAL DAILY
Qty: 30 TABLET | Refills: 0 | Status: SHIPPED | OUTPATIENT
Start: 2023-08-31 | End: 2023-09-06 | Stop reason: SDUPTHER

## 2023-09-05 ENCOUNTER — PATIENT OUTREACH (OUTPATIENT)
Dept: ADMINISTRATIVE | Facility: HOSPITAL | Age: 38
End: 2023-09-05
Payer: COMMERCIAL

## 2023-09-05 NOTE — PROGRESS NOTES
Health Maintenance Due   Topic Date Due    Eye Exam  Never done    Pneumococcal Vaccines (Age 0-64) (2 - PCV) 09/09/2000    TETANUS VACCINE  Never done    COVID-19 Vaccine (4 - Moderna series) 02/24/2022    Foot Exam  08/17/2023    Influenza Vaccine (1) 09/01/2023     Chart reviewed.   Immunizations: Reconciled  Orders placed: N/A  Upcoming appts to satisfy IVANA topics: N/A

## 2023-09-06 ENCOUNTER — OFFICE VISIT (OUTPATIENT)
Dept: INTERNAL MEDICINE | Facility: CLINIC | Age: 38
End: 2023-09-06
Payer: COMMERCIAL

## 2023-09-06 VITALS
BODY MASS INDEX: 36.73 KG/M2 | SYSTOLIC BLOOD PRESSURE: 118 MMHG | HEIGHT: 75 IN | WEIGHT: 295.44 LBS | OXYGEN SATURATION: 96 % | HEART RATE: 105 BPM | DIASTOLIC BLOOD PRESSURE: 86 MMHG | TEMPERATURE: 97 F | RESPIRATION RATE: 18 BRPM

## 2023-09-06 DIAGNOSIS — E11.69 TYPE 2 DIABETES MELLITUS WITH OBESITY: ICD-10-CM

## 2023-09-06 DIAGNOSIS — E78.5 HYPERLIPIDEMIA ASSOCIATED WITH TYPE 2 DIABETES MELLITUS: ICD-10-CM

## 2023-09-06 DIAGNOSIS — E11.69 HYPERLIPIDEMIA ASSOCIATED WITH TYPE 2 DIABETES MELLITUS: ICD-10-CM

## 2023-09-06 DIAGNOSIS — E11.65 TYPE 2 DIABETES MELLITUS WITH HYPERGLYCEMIA, WITHOUT LONG-TERM CURRENT USE OF INSULIN: ICD-10-CM

## 2023-09-06 DIAGNOSIS — Z00.00 ENCOUNTER FOR ANNUAL HEALTH EXAMINATION: Primary | ICD-10-CM

## 2023-09-06 DIAGNOSIS — I15.2 HYPERTENSION ASSOCIATED WITH DIABETES: ICD-10-CM

## 2023-09-06 DIAGNOSIS — E11.9 DIABETIC EYE EXAM: ICD-10-CM

## 2023-09-06 DIAGNOSIS — Z01.00 DIABETIC EYE EXAM: ICD-10-CM

## 2023-09-06 DIAGNOSIS — K85.30 DRUG-INDUCED ACUTE PANCREATITIS WITHOUT INFECTION OR NECROSIS: ICD-10-CM

## 2023-09-06 DIAGNOSIS — E55.9 VITAMIN D INSUFFICIENCY: ICD-10-CM

## 2023-09-06 DIAGNOSIS — E11.59 HYPERTENSION ASSOCIATED WITH DIABETES: ICD-10-CM

## 2023-09-06 DIAGNOSIS — E66.9 TYPE 2 DIABETES MELLITUS WITH OBESITY: ICD-10-CM

## 2023-09-06 PROCEDURE — 3060F PR POS MICROALBUMINURIA RESULT DOCUMENTED/REVIEW: ICD-10-PCS | Mod: CPTII,S$GLB,, | Performed by: FAMILY MEDICINE

## 2023-09-06 PROCEDURE — 3008F PR BODY MASS INDEX (BMI) DOCUMENTED: ICD-10-PCS | Mod: CPTII,S$GLB,, | Performed by: FAMILY MEDICINE

## 2023-09-06 PROCEDURE — 3066F PR DOCUMENTATION OF TREATMENT FOR NEPHROPATHY: ICD-10-PCS | Mod: CPTII,S$GLB,, | Performed by: FAMILY MEDICINE

## 2023-09-06 PROCEDURE — 3046F PR MOST RECENT HEMOGLOBIN A1C LEVEL > 9.0%: ICD-10-PCS | Mod: CPTII,S$GLB,, | Performed by: FAMILY MEDICINE

## 2023-09-06 PROCEDURE — 99999 PR PBB SHADOW E&M-EST. PATIENT-LVL V: CPT | Mod: PBBFAC,,, | Performed by: FAMILY MEDICINE

## 2023-09-06 PROCEDURE — 3008F BODY MASS INDEX DOCD: CPT | Mod: CPTII,S$GLB,, | Performed by: FAMILY MEDICINE

## 2023-09-06 PROCEDURE — 3060F POS MICROALBUMINURIA REV: CPT | Mod: CPTII,S$GLB,, | Performed by: FAMILY MEDICINE

## 2023-09-06 PROCEDURE — 3079F DIAST BP 80-89 MM HG: CPT | Mod: CPTII,S$GLB,, | Performed by: FAMILY MEDICINE

## 2023-09-06 PROCEDURE — 3074F SYST BP LT 130 MM HG: CPT | Mod: CPTII,S$GLB,, | Performed by: FAMILY MEDICINE

## 2023-09-06 PROCEDURE — 3066F NEPHROPATHY DOC TX: CPT | Mod: CPTII,S$GLB,, | Performed by: FAMILY MEDICINE

## 2023-09-06 PROCEDURE — 3079F PR MOST RECENT DIASTOLIC BLOOD PRESSURE 80-89 MM HG: ICD-10-PCS | Mod: CPTII,S$GLB,, | Performed by: FAMILY MEDICINE

## 2023-09-06 PROCEDURE — 3046F HEMOGLOBIN A1C LEVEL >9.0%: CPT | Mod: CPTII,S$GLB,, | Performed by: FAMILY MEDICINE

## 2023-09-06 PROCEDURE — 99999 PR PBB SHADOW E&M-EST. PATIENT-LVL V: ICD-10-PCS | Mod: PBBFAC,,, | Performed by: FAMILY MEDICINE

## 2023-09-06 PROCEDURE — 1159F MED LIST DOCD IN RCRD: CPT | Mod: CPTII,S$GLB,, | Performed by: FAMILY MEDICINE

## 2023-09-06 PROCEDURE — 1159F PR MEDICATION LIST DOCUMENTED IN MEDICAL RECORD: ICD-10-PCS | Mod: CPTII,S$GLB,, | Performed by: FAMILY MEDICINE

## 2023-09-06 PROCEDURE — 99396 PREV VISIT EST AGE 40-64: CPT | Mod: S$GLB,,, | Performed by: FAMILY MEDICINE

## 2023-09-06 PROCEDURE — 99396 PR PREVENTIVE VISIT,EST,40-64: ICD-10-PCS | Mod: S$GLB,,, | Performed by: FAMILY MEDICINE

## 2023-09-06 PROCEDURE — 1160F PR REVIEW ALL MEDS BY PRESCRIBER/CLIN PHARMACIST DOCUMENTED: ICD-10-PCS | Mod: CPTII,S$GLB,, | Performed by: FAMILY MEDICINE

## 2023-09-06 PROCEDURE — 1160F RVW MEDS BY RX/DR IN RCRD: CPT | Mod: CPTII,S$GLB,, | Performed by: FAMILY MEDICINE

## 2023-09-06 PROCEDURE — 3074F PR MOST RECENT SYSTOLIC BLOOD PRESSURE < 130 MM HG: ICD-10-PCS | Mod: CPTII,S$GLB,, | Performed by: FAMILY MEDICINE

## 2023-09-06 RX ORDER — AMLODIPINE BESYLATE 10 MG/1
10 TABLET ORAL DAILY
Qty: 90 TABLET | Refills: 3 | Status: SHIPPED | OUTPATIENT
Start: 2023-09-06 | End: 2024-09-05

## 2023-09-06 RX ORDER — METFORMIN HYDROCHLORIDE 1000 MG/1
1000 TABLET ORAL 2 TIMES DAILY WITH MEALS
Qty: 180 TABLET | Refills: 3 | Status: SHIPPED | OUTPATIENT
Start: 2023-09-06 | End: 2024-09-05

## 2023-09-06 RX ORDER — LOSARTAN POTASSIUM AND HYDROCHLOROTHIAZIDE 12.5; 1 MG/1; MG/1
1 TABLET ORAL DAILY
Qty: 90 TABLET | Refills: 3 | Status: SHIPPED | OUTPATIENT
Start: 2023-09-06 | End: 2024-09-05

## 2023-09-06 RX ORDER — CARVEDILOL 6.25 MG/1
6.25 TABLET ORAL 2 TIMES DAILY WITH MEALS
Qty: 180 TABLET | Refills: 3 | Status: SHIPPED | OUTPATIENT
Start: 2023-09-06 | End: 2024-09-05

## 2023-09-06 RX ORDER — BLOOD-GLUCOSE CONTROL, NORMAL
EACH MISCELLANEOUS
Qty: 200 EACH | Refills: 4 | Status: SHIPPED | OUTPATIENT
Start: 2023-09-06

## 2023-09-06 RX ORDER — ATORVASTATIN CALCIUM 10 MG/1
10 TABLET, FILM COATED ORAL DAILY
Qty: 90 TABLET | Refills: 3 | Status: SHIPPED | OUTPATIENT
Start: 2023-09-06 | End: 2024-09-05

## 2023-09-06 NOTE — PROGRESS NOTES
Subjective     Patient ID: Josh Julio is a 38 y.o. male.    Chief Complaint: Annual Exam    HPI 38-year-old  male presents to clinic today for annual physical exam.  He has not been seen since May of 2021.  He has been followed by Zaira Romo NP for treatment of uncontrolled type 2 diabetes and has seen other providers secondary to continued uncontrolled hypertension.  He was seen on August 24, 2023 by Rodri Armstrong MD secondary to a hospital follow-up.  Hospital records are unavailable as the patient reports he was admitted at [facility] secondary to pancreatitis secondary to Trulicity.  He reports that he was told to stop Trulicity.  At this time he remains on metformin 1000 mg b.i.d. and Jardiance 25 mg daily.  Current A1c is 9.5.  He is currently treated for hypertension which is finally well controlled on amlodipine 10 mg daily, carvedilol 6.25 mg b.i.d., and losartan-hydrochlorothiazide 100/12.5 mg daily.  Hyperlipidemia is well controlled on Lipitor 10 mg daily.  He reports no significant past surgical history.  He reports a family history of hypertension and diabetes.  His maternal grandmother also had renal disease.  He is up-to-date with all vaccinations.  Review of Systems   Constitutional:  Negative for appetite change, chills, fatigue and fever.   HENT:  Negative for nasal congestion, ear pain, hearing loss, postnasal drip, rhinorrhea, sinus pressure/congestion, sore throat and tinnitus.    Eyes:  Negative for redness, itching and visual disturbance.   Respiratory:  Negative for cough, chest tightness and shortness of breath.    Cardiovascular:  Negative for chest pain and palpitations.   Gastrointestinal:  Negative for abdominal pain, constipation, diarrhea, nausea and vomiting.   Genitourinary:  Negative for decreased urine volume, difficulty urinating, dysuria, frequency, hematuria and urgency.   Musculoskeletal:  Negative for back pain, myalgias, neck pain and neck  stiffness.   Integumentary:  Negative for rash.   Neurological:  Negative for dizziness, light-headedness and headaches.   Psychiatric/Behavioral: Negative.            Objective     Physical Exam  Vitals and nursing note reviewed.   Constitutional:       General: He is not in acute distress.     Appearance: Normal appearance. He is well-developed. He is not diaphoretic.   HENT:      Head: Normocephalic and atraumatic.      Right Ear: External ear normal.      Left Ear: External ear normal.      Nose: Nose normal.      Mouth/Throat:      Pharynx: No oropharyngeal exudate.   Eyes:      General: No scleral icterus.        Right eye: No discharge.         Left eye: No discharge.      Conjunctiva/sclera: Conjunctivae normal.      Pupils: Pupils are equal, round, and reactive to light.   Neck:      Thyroid: No thyromegaly.      Vascular: No JVD.      Trachea: No tracheal deviation.   Cardiovascular:      Rate and Rhythm: Normal rate and regular rhythm.      Pulses:           Dorsalis pedis pulses are 2+ on the right side and 2+ on the left side.        Posterior tibial pulses are 2+ on the right side and 2+ on the left side.      Heart sounds: Normal heart sounds. No murmur heard.     No friction rub. No gallop.   Pulmonary:      Effort: Pulmonary effort is normal. No respiratory distress.      Breath sounds: Normal breath sounds. No stridor. No wheezing, rhonchi or rales.   Chest:      Chest wall: No tenderness.   Abdominal:      General: Bowel sounds are normal. There is no distension.      Palpations: Abdomen is soft. There is no mass.      Tenderness: There is no abdominal tenderness. There is no guarding or rebound.   Musculoskeletal:         General: No tenderness. Normal range of motion.      Cervical back: Normal range of motion and neck supple.      Right foot: Normal range of motion. No deformity, bunion, Charcot foot, foot drop or prominent metatarsal heads.      Left foot: Normal range of motion. No deformity,  bunion, Charcot foot, foot drop or prominent metatarsal heads.   Feet:      Right foot:      Protective Sensation: 10 sites tested.  10 sites sensed.      Skin integrity: Skin integrity normal.      Toenail Condition: Right toenails are normal.      Left foot:      Protective Sensation: 10 sites tested.  10 sites sensed.      Skin integrity: Skin integrity normal.      Toenail Condition: Left toenails are normal.   Lymphadenopathy:      Cervical: No cervical adenopathy.   Skin:     General: Skin is warm and dry.      Coloration: Skin is not pale.      Findings: No erythema or rash.   Neurological:      Mental Status: He is alert and oriented to person, place, and time.   Psychiatric:         Mood and Affect: Mood normal.         Behavior: Behavior normal.         Thought Content: Thought content normal.         Judgment: Judgment normal.            Assessment and Plan     1. Encounter for annual health examination    2. Hypertension associated with diabetes  -     carvediloL (COREG) 6.25 MG tablet; Take 1 tablet (6.25 mg total) by mouth 2 (two) times daily with meals.  Dispense: 180 tablet; Refill: 3  -     losartan-hydrochlorothiazide 100-12.5 mg (HYZAAR) 100-12.5 mg Tab; Take 1 tablet by mouth once daily.  Dispense: 90 tablet; Refill: 3  -     amLODIPine (NORVASC) 10 MG tablet; Take 1 tablet (10 mg total) by mouth once daily.  Dispense: 90 tablet; Refill: 3    3. Hyperlipidemia associated with type 2 diabetes mellitus  -     atorvastatin (LIPITOR) 10 MG tablet; Take 1 tablet (10 mg total) by mouth once daily.  Dispense: 90 tablet; Refill: 3    4. Type 2 diabetes mellitus with hyperglycemia, without long-term current use of insulin  -     Ambulatory referral/consult to Internal Medicine; Future; Expected date: 09/13/2023  -     empagliflozin (JARDIANCE) 25 mg tablet; Take 1 tablet (25 mg total) by mouth once daily.  Dispense: 90 tablet; Refill: 3  -     metFORMIN (GLUCOPHAGE) 1000 MG tablet; Take 1 tablet (1,000 mg  total) by mouth 2 (two) times daily with meals.  Dispense: 180 tablet; Refill: 3  -     lancets 30 gauge Misc; 1 application by Misc.(Non-Drug; Combo Route) route 2 (two) times daily before meals.  Dispense: 200 each; Refill: 4  -     blood sugar diagnostic Strp; 1 strip by Misc.(Non-Drug; Combo Route) route 2 (two) times daily before meals.  Dispense: 200 strip; Refill: 4    5. Type 2 diabetes mellitus with obesity  -     Ambulatory referral/consult to Internal Medicine; Future; Expected date: 09/13/2023    6. Drug-induced acute pancreatitis without infection or necrosis  Overview:  Seen at [facility] 8-. Trulicity was stopped.      7. Vitamin D insufficiency    8. Diabetic eye exam  -     Ambulatory referral/consult to Optometry; Future; Expected date: 09/13/2023    9. Type 2 diabetes mellitus without complication, without long-term current use of insulin        1. Labs have been reviewed and continue to reveal an elevated hemoglobin A1c.  2. Continue amlodipine 10 mg daily, carvedilol 6.25 mg b.i.d., and losartan-hydrochlorothiazide 100/12.5 mg daily.  Hypertension is well controlled.  3. Continue Lipitor 10 mg daily.  Hyperlipidemia is well controlled.  4. Continue metformin 1000 mg b.i.d. and Jardiance 25 mg daily.  Hemoglobin A1c has improved to 9.5 but continues to remain elevated.  Patient had previously been on Trulicity developed pancreatitis secondary to Trulicity; therefore, medication has been discontinued.  I have re-referred the patient to Zaira Romo NP for continued treatment recommendations for diabetes.  5. Continue diet and exercise.  6. Continue over-the-counter vitamin-D 2000 units daily.  Vitamin-D insufficiency is stable.  7. Refer to Optometry for diabetic eye exam.  8. Return to clinic as needed or in 1 year for annual physical exam.               Follow up in about 1 year (around 9/6/2024), or if symptoms worsen or fail to improve, for Annual exam.

## 2023-09-08 ENCOUNTER — PATIENT MESSAGE (OUTPATIENT)
Dept: INTERNAL MEDICINE | Facility: CLINIC | Age: 38
End: 2023-09-08
Payer: COMMERCIAL

## 2023-09-27 ENCOUNTER — OFFICE VISIT (OUTPATIENT)
Dept: INTERNAL MEDICINE | Facility: CLINIC | Age: 38
End: 2023-09-27
Payer: COMMERCIAL

## 2023-09-27 VITALS
RESPIRATION RATE: 16 BRPM | TEMPERATURE: 97 F | HEIGHT: 75 IN | DIASTOLIC BLOOD PRESSURE: 90 MMHG | WEIGHT: 294.31 LBS | OXYGEN SATURATION: 99 % | BODY MASS INDEX: 36.59 KG/M2 | HEART RATE: 100 BPM | SYSTOLIC BLOOD PRESSURE: 130 MMHG

## 2023-09-27 DIAGNOSIS — E11.59 HYPERTENSION ASSOCIATED WITH DIABETES: ICD-10-CM

## 2023-09-27 DIAGNOSIS — E78.5 HYPERLIPIDEMIA ASSOCIATED WITH TYPE 2 DIABETES MELLITUS: ICD-10-CM

## 2023-09-27 DIAGNOSIS — E11.69 HYPERLIPIDEMIA ASSOCIATED WITH TYPE 2 DIABETES MELLITUS: ICD-10-CM

## 2023-09-27 DIAGNOSIS — I15.2 HYPERTENSION ASSOCIATED WITH DIABETES: ICD-10-CM

## 2023-09-27 DIAGNOSIS — E11.65 TYPE 2 DIABETES MELLITUS WITH HYPERGLYCEMIA, WITHOUT LONG-TERM CURRENT USE OF INSULIN: Primary | ICD-10-CM

## 2023-09-27 DIAGNOSIS — K85.30 DRUG-INDUCED ACUTE PANCREATITIS WITHOUT INFECTION OR NECROSIS: ICD-10-CM

## 2023-09-27 DIAGNOSIS — E11.69 TYPE 2 DIABETES MELLITUS WITH OBESITY: ICD-10-CM

## 2023-09-27 DIAGNOSIS — E66.9 TYPE 2 DIABETES MELLITUS WITH OBESITY: ICD-10-CM

## 2023-09-27 LAB — GLUCOSE SERPL-MCNC: 198 MG/DL (ref 70–110)

## 2023-09-27 PROCEDURE — 3060F PR POS MICROALBUMINURIA RESULT DOCUMENTED/REVIEW: ICD-10-PCS | Mod: CPTII,S$GLB,, | Performed by: NURSE PRACTITIONER

## 2023-09-27 PROCEDURE — 82962 GLUCOSE BLOOD TEST: CPT | Mod: S$GLB,,, | Performed by: NURSE PRACTITIONER

## 2023-09-27 PROCEDURE — 3066F PR DOCUMENTATION OF TREATMENT FOR NEPHROPATHY: ICD-10-PCS | Mod: CPTII,S$GLB,, | Performed by: NURSE PRACTITIONER

## 2023-09-27 PROCEDURE — 99999 PR PBB SHADOW E&M-EST. PATIENT-LVL V: ICD-10-PCS | Mod: PBBFAC,,, | Performed by: NURSE PRACTITIONER

## 2023-09-27 PROCEDURE — 3080F DIAST BP >= 90 MM HG: CPT | Mod: CPTII,S$GLB,, | Performed by: NURSE PRACTITIONER

## 2023-09-27 PROCEDURE — 3080F PR MOST RECENT DIASTOLIC BLOOD PRESSURE >= 90 MM HG: ICD-10-PCS | Mod: CPTII,S$GLB,, | Performed by: NURSE PRACTITIONER

## 2023-09-27 PROCEDURE — 82962 POCT GLUCOSE, HAND-HELD DEVICE: ICD-10-PCS | Mod: S$GLB,,, | Performed by: NURSE PRACTITIONER

## 2023-09-27 PROCEDURE — 99214 PR OFFICE/OUTPT VISIT, EST, LEVL IV, 30-39 MIN: ICD-10-PCS | Mod: S$GLB,,, | Performed by: NURSE PRACTITIONER

## 2023-09-27 PROCEDURE — 99999 PR PBB SHADOW E&M-EST. PATIENT-LVL V: CPT | Mod: PBBFAC,,, | Performed by: NURSE PRACTITIONER

## 2023-09-27 PROCEDURE — 3008F BODY MASS INDEX DOCD: CPT | Mod: CPTII,S$GLB,, | Performed by: NURSE PRACTITIONER

## 2023-09-27 PROCEDURE — 3075F SYST BP GE 130 - 139MM HG: CPT | Mod: CPTII,S$GLB,, | Performed by: NURSE PRACTITIONER

## 2023-09-27 PROCEDURE — 99214 OFFICE O/P EST MOD 30 MIN: CPT | Mod: S$GLB,,, | Performed by: NURSE PRACTITIONER

## 2023-09-27 PROCEDURE — 3046F HEMOGLOBIN A1C LEVEL >9.0%: CPT | Mod: CPTII,S$GLB,, | Performed by: NURSE PRACTITIONER

## 2023-09-27 PROCEDURE — 3060F POS MICROALBUMINURIA REV: CPT | Mod: CPTII,S$GLB,, | Performed by: NURSE PRACTITIONER

## 2023-09-27 PROCEDURE — 3075F PR MOST RECENT SYSTOLIC BLOOD PRESS GE 130-139MM HG: ICD-10-PCS | Mod: CPTII,S$GLB,, | Performed by: NURSE PRACTITIONER

## 2023-09-27 PROCEDURE — 3008F PR BODY MASS INDEX (BMI) DOCUMENTED: ICD-10-PCS | Mod: CPTII,S$GLB,, | Performed by: NURSE PRACTITIONER

## 2023-09-27 PROCEDURE — 3046F PR MOST RECENT HEMOGLOBIN A1C LEVEL > 9.0%: ICD-10-PCS | Mod: CPTII,S$GLB,, | Performed by: NURSE PRACTITIONER

## 2023-09-27 PROCEDURE — 3066F NEPHROPATHY DOC TX: CPT | Mod: CPTII,S$GLB,, | Performed by: NURSE PRACTITIONER

## 2023-09-27 RX ORDER — INSULIN DEGLUDEC 100 U/ML
15 INJECTION, SOLUTION SUBCUTANEOUS DAILY
Qty: 15 ML | Refills: 3 | Status: SHIPPED | OUTPATIENT
Start: 2023-09-27 | End: 2023-09-27

## 2023-09-27 RX ORDER — INSULIN DEGLUDEC 100 U/ML
10 INJECTION, SOLUTION SUBCUTANEOUS DAILY
Qty: 15 ML | Refills: 3 | Status: SHIPPED | OUTPATIENT
Start: 2023-09-27

## 2023-09-27 RX ORDER — PEN NEEDLE, DIABETIC 30 GX3/16"
NEEDLE, DISPOSABLE MISCELLANEOUS
Qty: 100 EACH | Refills: 1 | Status: SHIPPED | OUTPATIENT
Start: 2023-09-27

## 2023-09-27 RX ORDER — FLASH GLUCOSE SENSOR
1 KIT MISCELLANEOUS
Qty: 2 KIT | Refills: 11 | Status: SHIPPED | OUTPATIENT
Start: 2023-09-27

## 2023-09-27 NOTE — PATIENT INSTRUCTIONS
Continue metformin 1000mg twice per day.   Continue the jardiance 25mg tablet every morning.     Start tresiba insulin 10 units once per day injection - this insulin lasts 24 hours, so should be given at the same time every day.     Start the freestyle armando 2 -   Scan as often as you can!     You will meet with the diabetes educator to facilitate changes made in plan of care.     Normal glucose levels before eating should range 90 - 130's.   The glucose does go up with eating, but should be not going above/or staying above 180 - 200 range.     Exercise/walk as you can.     Continue Diabetic diet.     For low blood sugar - remember to keep glucose tablets on hand. You can purchase these at the pharmacy check out desk/over the counter.   If blood sugar is less than 70, take/eat 2 - 3 tablets quickly to bring your sugar up.   Always keep 15 grams of Quick acting carbohydrates on hand to eat/drink if your sugar is low - examples are 1/2 cup of juice, coke, or crackers, granola bars.   Remember to eat meals frequently to prevent low blood blood sugars.

## 2023-09-27 NOTE — PROGRESS NOTES
38 y.o. male here for routine follow up visit for management of T2dm -   Last seen about 1 year ago -     A1c continues to be elevated @ 9.5% currently.   He was hospitalized for pancreatitis and his trulicity was stopped - this was about 1 month ago -   In the hospital at [facility] for 4 days.   This was a wakeup call for him and he began eating better/differently.   Has cut out fast foods/cokes mostly.   Current meds:   metformin 1000 bid.   Jardiance 25mg tablet daily.     He is walking a bit in his neighborhood for exercise.   Working full time - [employer]. A lot of physical activity.   Has a large family/children. Stays very busy     We had discussed started insulin at last visit - basal insulin next visit if glucoses are still not at goal.   He does want to work on diet one last time before starting on insulin -   I reviewed CV risk factors/reinforced with high blood sugar.       Last visit notes from 2022:   37 y.o. male here for 1 year follow up - for diabetes management.   Type 2 -     a1c is still elevated at 11.6%. it has come down [facility] last visit.   But is still very elevated.   He continues metformin 1000mg twice per day.   Did start taking farxiga and is taking 5mg every morning.   Also on trulcity- his dose as incrased to 1.5m every week -   Checks sugars daily usually and states they run in the 200's.     Admits not eating correctly - fast food still and a coke usually once per week.   He thinks he has gained weight over the past year.     He is working a lot, two jobs.   Has 5 children =     Last visit notes from June 2021:   HPI: Josh Julio is a 38 y.o.  male c/I for visit to address Diabetes Type 2  This is the first time I am seeing him for care, follows with James Argueta MD for primary care needs.   Has not seen endocrinology or diabetes specialist in the past.     was diagnosed with T2DM about 2 years ago - but may have had prior.   States was not really  going to the doctor much beforehand.   Is motivated/ready to take control of his health.   Maternal grandmother had T2dm   Has never been hospitalized r/t DM.  Denies missing doses of DM medication.     Current a1c is elevated @ 12.1 %   He is on metformin 1000 mg twice per day. Doesn't skip doses.   Had been taking for about 2 years.   Has not taken any other medication for diabetes.   Was recommended at last visit to start on trulicity, however patient states he has never gotten any new prescriptions.    He has recently started checking sugars in morning.   States running 200 - 240's - personal monitor.   He admits does not follow ADA diet - however isn't sure what he should eat/not eat.   Used to drink 3 cokes per day but recently switched to orange juice and sugar free gatorade.     Complications from diabetes include:   Negative for DKA.   Has never taken insulin in life.   -negative for diabetic neuropathy.   -negative for nephropathy.   +microalbuminuria.   Eyes - negative for known Diabetic retinopathy   Denies known CV disease or CAD.   HTN - Losartan and norvasc.   HLD - lipitor 10 - states just started on recently.   He denies any acute complaints or symptoms of diabetes -   Denies fatigue, polydipsia, polyuria, headache or vision changes.     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    Mixed hyperlipidemia 10/24/2020    Type 2 diabetes mellitus without complication, without long-term current use of insulin 10/21/2020    Vitamin D insufficiency 10/24/2020      Family hx:   Family History   Problem Relation Age of Onset    Hypertension Mother     No Known Problems Father     Diabetes Maternal Grandmother     Hypertension Maternal Grandmother     Kidney disease Maternal Grandmother     Hypertension Brother       Current meds:   Current Outpatient  Medications:     amLODIPine (NORVASC) 10 MG tablet, Take 1 tablet (10 mg total) by mouth once daily., Disp: 90 tablet, Rfl: 3    atorvastatin (LIPITOR) 10 MG tablet, Take 1 tablet (10 mg total) by mouth once daily., Disp: 90 tablet, Rfl: 3    blood sugar diagnostic Strp, 1 strip by Misc.(Non-Drug; Combo Route) route 2 (two) times daily before meals., Disp: 200 strip, Rfl: 4    carvediloL (COREG) 6.25 MG tablet, Take 1 tablet (6.25 mg total) by mouth 2 (two) times daily with meals., Disp: 180 tablet, Rfl: 3    empagliflozin (JARDIANCE) 25 mg tablet, Take 1 tablet (25 mg total) by mouth once daily., Disp: 90 tablet, Rfl: 3    lancets 30 gauge Misc, 1 application by Misc.(Non-Drug; Combo Route) route 2 (two) times daily before meals., Disp: 200 each, Rfl: 4    losartan-hydrochlorothiazide 100-12.5 mg (HYZAAR) 100-12.5 mg Tab, Take 1 tablet by mouth once daily., Disp: 90 tablet, Rfl: 3    metFORMIN (GLUCOPHAGE) 1000 MG tablet, Take 1 tablet (1,000 mg total) by mouth 2 (two) times daily with meals., Disp: 180 tablet, Rfl: 3    flash glucose sensor (FREESTYLE JOE 2 SENSOR) Kit, 1 each by Misc.(Non-Drug; Combo Route) route every 14 (fourteen) days., Disp: 2 kit, Rfl: 11    insulin degludec (TRESIBA FLEXTOUCH U-100) 100 unit/mL (3 mL) insulin pen, Inject 15 Units into the skin once daily. Can titrate up by 2 units daily - to get to goal fasting sugars of 100 - 150.  Max total daily dose is 60 units/day., Disp: 15 mL, Rfl: 3     Current Diabetes medications:   Metformin 1000mg bid.   Jardiance 25mg tablet daily.     Medications Tried and Failed:   Trulicity 1.5mg SC eery week.   farxiga 5mg tablet per day.     Review of Pertinent co-morbidities/risk factors:   CV: Denies history of MI nor stroke. States stroke history in his family.   CAD: Denies.  Takes aspirin 81mg tablet daily  BP: has history of HTN  Statin: Taking  ACE/ARB: Taking    Social History     Tobacco Use   Smoking Status Never   Smokeless Tobacco Never     "  Social:   Lives at home with: phillip - 3 kids but they live with mother, has 2 stepchildren with them - 6 and 13 y.o.  Life changes/stressors currently: recently got  -   Diet: following ADA diet   Meals: 3 per day and snacks.        Breakfast - sausage mcgriddle        Lunch - fast food - popeyes, burger benjamin, [employer]s -       Dinner - fast food - [employer]        Snacks - chips, brownies         Drinks - cokes - 3 per day, orange juice, sugar free gatorade.   Exercise: none.   Activities: [employer]  now - physical activity -     Glucose Monitoring:   Checking sugar daily in the morning. 200 - 250's.     Standards of care:   Eyes: .Most Recent Eye Exam Date: Not Found  Foot exam: : 09/06/2023   Diabetes education: None.    Vital Signs  BP (!) 130/90 (BP Location: Right arm, Patient Position: Sitting, BP Method: Large (Automatic))   Pulse 100   Temp 97.1 °F (36.2 °C) (Temporal)   Resp 16   Ht 6' 3" (1.905 m)   Wt 133.5 kg (294 lb 5 oz)   SpO2 99%   BMI 36.79 kg/m²     Pertinent Labs:   Hgba1c   Lab Results   Component Value Date    HGBA1C 9.5 (H) 08/28/2023    HGBA1C 12.6 (H) 12/22/2022    HGBA1C 11.6 (H) 08/12/2022     Lipid panel   Lab Results   Component Value Date    CHOL 103 (L) 08/28/2023    CHOL 221 (H) 08/12/2022    CHOL 206 (H) 05/21/2021     Lab Results   Component Value Date    HDL 31 (L) 08/28/2023    HDL 46 08/12/2022    HDL 40 05/21/2021     Lab Results   Component Value Date    LDLCALC 44.0 (L) 08/28/2023    LDLCALC 118.4 08/12/2022    LDLCALC 89.0 05/21/2021     Lab Results   Component Value Date    TRIG 140 08/28/2023    TRIG 283 (H) 08/12/2022    TRIG 385 (H) 05/21/2021     Lab Results   Component Value Date    CHOLHDL 30.1 08/28/2023    CHOLHDL 20.8 08/12/2022    CHOLHDL 19.4 (L) 05/21/2021      CMP  Glucose   Date Value Ref Range Status   08/28/2023 151 (H) 70 - 110 mg/dL Final     BUN   Date Value Ref Range Status   08/28/2023 16 6 - 20 mg/dL Final     Creatinine   Date " Value Ref Range Status   08/28/2023 1.3 0.5 - 1.4 mg/dL Final     eGFR if    Date Value Ref Range Status   05/21/2021 >60.0 >60 mL/min/1.73 m^2 Final   05/21/2021 >60.0 >60 mL/min/1.73 m^2 Final     eGFR if non    Date Value Ref Range Status   05/21/2021 >60.0 >60 mL/min/1.73 m^2 Final     Comment:     Calculation used to obtain the estimated glomerular filtration  rate (eGFR) is the CKD-EPI equation.      05/21/2021 >60.0 >60 mL/min/1.73 m^2 Final     Comment:     Calculation used to obtain the estimated glomerular filtration  rate (eGFR) is the CKD-EPI equation.        AST   Date Value Ref Range Status   08/28/2023 20 10 - 40 U/L Final     ALT   Date Value Ref Range Status   08/28/2023 26 10 - 44 U/L Final     Microalbumin creatinine ratio:   Lab Results   Component Value Date    MICALBCREAT 35.2 (H) 08/28/2023       Review Of Systems:   Gen: Appetite good, + weight gain, denies fatigue and weakness. Denies polydipsia.   Skin: Skin is intact and heals well, denies any rashes or hair changes.   EENT: Denies any acute visual disturbances, nor blurred vision.   Resp: Denies SOB or Dyspnea on exertion, denies cough.   Cardiac: Denies chest pain, palpitations, or swelling.   GI: Denies abdominal pain, nausea or vomiting, diarrhea, or constipation.   /GYN: Denies nocturia, nor burning, frequency or pain on urination.  MS/Neuro: Denies numbness/ tingling in BLE; Gait steady, speech clear  Psych: Denies drug/ETOH abuse, no hx of depression.  Other systems: negative.    Physical Exam  Constitutional:       Appearance: Normal appearance.   HENT:      Head: Normocephalic.      Nose: Nose normal.   Eyes:      Extraocular Movements: Extraocular movements intact.      Pupils: Pupils are equal, round, and reactive to light.   Pulmonary:      Effort: Pulmonary effort is normal.   Abdominal:      General: Abdomen is flat.      Palpations: Abdomen is soft.   Musculoskeletal:         General: Normal  range of motion.      Cervical back: Normal range of motion.   Skin:     General: Skin is warm and dry.      Capillary Refill: Capillary refill takes less than 2 seconds.   Neurological:      General: No focal deficit present.      Mental Status: He is alert and oriented to person, place, and time.   Psychiatric:         Mood and Affect: Mood normal.         Behavior: Behavior normal.         Thought Content: Thought content normal.         Judgment: Judgment normal.          Assessment and Plan of Care:     Josh was seen today for follow-up.    Diagnoses and all orders for this visit:    Type 2 diabetes mellitus with hyperglycemia, without long-term current use of insulin  -     Ambulatory referral/consult to Internal Medicine  -     insulin degludec (TRESIBA FLEXTOUCH U-100) 100 unit/mL (3 mL) insulin pen; Inject 15 Units into the skin once daily. Can titrate up by 2 units daily - to get to goal fasting sugars of 100 - 150.   Max total daily dose is 60 units/day.  -     Ambulatory referral/consult to Diabetes Education; Future  -     Ambulatory referral/consult to Ophthalmology; Future  -     flash glucose sensor (FREESTYLE JOE 2 SENSOR) Kit; 1 each by Misc.(Non-Drug; Combo Route) route every 14 (fourteen) days.  -     POCT Glucose, Hand-Held Device    Type 2 diabetes mellitus with obesity  -     Ambulatory referral/consult to Internal Medicine    Drug-induced acute pancreatitis without infection or necrosis    Hyperlipidemia associated with type 2 diabetes mellitus    Hypertension associated with diabetes       1. T2DM with hyperglycemia- Hgba1c goal is 7.5% or less without hypoglycemia - 13.9%---> 12.1%--> 11.6% --> 9.8%   discussed DM, progression of disease, long term complications, CV risk factors and tx options.   Advise compliance with ADA diet and encourage exercise- gave list.   Placed referral for diabetes educator.   Continue metformin 1000 bid.   Continue jardiance 25mg tablet daily.   No more  glp1s due to pancreatitis.   Will start insulin - 10 untis daily - basal insulin tresiba sent in -   LiHe does want to work on diet one last time before starting on insulin - I reviewed CV risk factors/reinforced with high blood sugar.   Start jossy 2 - approved on his insurance plan looks like for $10   Diabetes educator consulted to teach on new insulin start.   He may be able to stop the insulin if glucoses are at goal/get improved/stabiliz with lifestyle changes.    Instructed to monitor Blood glucose daily in morning, and bring meter/ log to every clinic visit.     2. HTN- improving... has not taken bp meds yet today -   Losartan, norvasc.-     3. HLD - LDL goal < 100. ldl much improved - Advised diet changes/eliminate cokes and fast foods.   Currently on statin therapy-    4. Weight - BMI Body mass index is 36.79 kg/m².   Encourage Ada diet and exercise.       5. Renal Function - stable.   No nephropathy.   + urine mac.    [id number].      Jossy 2 start.   Diabetes educator consulted -   New insulin start - but start at 10 - 15 units daily to begin - watch glucoses.   Follow up in 6 weeks with OV

## 2023-10-09 ENCOUNTER — PATIENT MESSAGE (OUTPATIENT)
Dept: INTERNAL MEDICINE | Facility: CLINIC | Age: 38
End: 2023-10-09
Payer: COMMERCIAL

## 2023-10-12 ENCOUNTER — TELEPHONE (OUTPATIENT)
Dept: OPTOMETRY | Facility: CLINIC | Age: 38
End: 2023-10-12
Payer: COMMERCIAL

## 2023-10-24 ENCOUNTER — TELEPHONE (OUTPATIENT)
Dept: INTERNAL MEDICINE | Facility: CLINIC | Age: 38
End: 2023-10-24
Payer: COMMERCIAL

## 2023-10-24 NOTE — TELEPHONE ENCOUNTER
----- Message from Ana Paula Araujo sent at 10/24/2023 12:54 PM CDT -----  Contact: [phone]  [email or web address]     Patient is calling for Medical Advice regarding: NEEDS TO KNOW HOW MANY UNITS TO GO FOR HIS SHOT BEFORE STICKING HIMSELF     How long has patient had these symptoms:    Pharmacy name and phone#:    Would like response via VenueJamhart: CALL BACK     Comments:

## 2023-10-24 NOTE — TELEPHONE ENCOUNTER
Pt wanted to know how many units to give of Tresiba insulin, according to provider notes he is to give 10 units daily. Pt verbalized understanding.

## 2023-10-25 ENCOUNTER — PATIENT OUTREACH (OUTPATIENT)
Dept: ADMINISTRATIVE | Facility: HOSPITAL | Age: 38
End: 2023-10-25
Payer: COMMERCIAL

## 2023-10-25 NOTE — PROGRESS NOTES
Health Maintenance Due   Topic Date Due    Pneumococcal Vaccines (Age 0-64) (2 - PCV) 09/09/2000    TETANUS VACCINE  Never done    Influenza Vaccine (1) 09/01/2023    COVID-19 Vaccine (4 - 2023-24 season) 09/01/2023     Chart reviewed.   Immunizations: Reconciled  Orders placed: N/A  Upcoming appts to satisfy IVANA topics: Optometry 12/15/2023

## 2023-10-30 ENCOUNTER — HOSPITAL ENCOUNTER (EMERGENCY)
Facility: HOSPITAL | Age: 38
Discharge: LEFT BEFORE DISCHARGE | End: 2023-10-30
Payer: COMMERCIAL

## 2023-10-30 VITALS
HEART RATE: 97 BPM | OXYGEN SATURATION: 97 % | HEIGHT: 75 IN | TEMPERATURE: 99 F | RESPIRATION RATE: 18 BRPM | DIASTOLIC BLOOD PRESSURE: 106 MMHG | SYSTOLIC BLOOD PRESSURE: 163 MMHG | WEIGHT: 300 LBS | BODY MASS INDEX: 37.3 KG/M2

## 2023-10-30 PROCEDURE — 99283 EMERGENCY DEPT VISIT LOW MDM: CPT

## 2023-10-30 PROCEDURE — 99281 EMR DPT VST MAYX REQ PHY/QHP: CPT

## 2023-10-30 NOTE — FIRST PROVIDER EVALUATION
"Medical screening examination initiated.  I have conducted a focused provider triage encounter, findings are as follows:    Brief history of present illness:  reports headaches, wants to check his blood pressure. Hx of high blood pressure, sleep apnea. Still taking medications as prescribed. No weakness, numbness, vision changes, chest pain, sob, nausea or vomiting    Vitals:    10/30/23 1718   BP: (!) 163/106   Pulse: 97   Resp: 18   Temp: 98.9 °F (37.2 °C)   TempSrc: Oral   SpO2: 97%   Weight: 136.1 kg (300 lb)   Height: 6' 3" (1.905 m)       Pertinent physical exam:  deferred    Brief workup plan:  labs, ekg    Preliminary workup initiated; this workup will be continued and followed by the physician or advanced practice provider that is assigned to the patient when roomed.  "

## 2024-01-09 ENCOUNTER — PATIENT OUTREACH (OUTPATIENT)
Dept: ADMINISTRATIVE | Facility: HOSPITAL | Age: 39
End: 2024-01-09
Payer: COMMERCIAL

## 2024-01-09 ENCOUNTER — PATIENT MESSAGE (OUTPATIENT)
Dept: ADMINISTRATIVE | Facility: HOSPITAL | Age: 39
End: 2024-01-09
Payer: COMMERCIAL

## 2024-01-09 NOTE — PROGRESS NOTES

## 2024-02-21 DIAGNOSIS — E11.9 TYPE 2 DIABETES MELLITUS WITHOUT COMPLICATION: ICD-10-CM

## 2024-02-26 ENCOUNTER — PATIENT MESSAGE (OUTPATIENT)
Dept: ADMINISTRATIVE | Facility: HOSPITAL | Age: 39
End: 2024-02-26
Payer: COMMERCIAL

## 2024-03-04 ENCOUNTER — TELEPHONE (OUTPATIENT)
Dept: SLEEP MEDICINE | Facility: CLINIC | Age: 39
End: 2024-03-04
Payer: COMMERCIAL

## 2024-03-04 NOTE — TELEPHONE ENCOUNTER
----- Message from Herbre Andrews sent at 3/4/2024 12:04 PM CST -----  Regarding: New Prescription  Name of Who is Calling:  Patient          What is the request in detail:  Patient stated he was told by the people who he will be getting the CPAP machine from ask him to contact his doctor for another prescription             Can the clinic reply by [facility]: No            What Number to Call Back if not in [facility]:[phone]

## 2024-03-07 ENCOUNTER — OFFICE VISIT (OUTPATIENT)
Dept: SLEEP MEDICINE | Facility: CLINIC | Age: 39
End: 2024-03-07
Payer: COMMERCIAL

## 2024-03-07 DIAGNOSIS — G47.33 OSA (OBSTRUCTIVE SLEEP APNEA): Primary | ICD-10-CM

## 2024-03-07 DIAGNOSIS — R35.1 NOCTURIA: ICD-10-CM

## 2024-03-07 DIAGNOSIS — I10 HYPERTENSION, UNSPECIFIED TYPE: ICD-10-CM

## 2024-03-07 PROCEDURE — 99214 OFFICE O/P EST MOD 30 MIN: CPT | Mod: 95,,, | Performed by: INTERNAL MEDICINE

## 2024-03-07 NOTE — PROGRESS NOTES
The patient location is: [place]  The chief complaint leading to consultation is: trouble with sleep    Visit type: audiovisual    15 minutes of total time spent on the encounter, which includes face to face time and non-face to face time preparing to see the patient (eg, review of tests), Obtaining and/or reviewing separately obtained history, Documenting clinical information in the electronic or other health record, Independently interpreting results (not separately reported) and communicating results to the patient/family/caregiver, or Care coordination (not separately reported).     Each patient to whom he or she provides medical services by telemedicine is:  (1) informed of the relationship between the physician and patient and the respective role of any other health care provider with respect to management of the patient; and (2) notified that he or she may decline to receive medical services by telemedicine and may withdraw from such care at any time.    ESTABLISHED PATIENT VISIT    Josh Julio  is a pleasant 38 y.o. male  with PMH significant for HTN, DM, BMI 35+, vit D who presented mid 2023 for loud snoring, witnessed apneas.    LOV:      Here today for: CPAP follow-up     Since last visit:     HST 5/11/23: AHI 30, RDI 37    Has not had a chance to get CPAP yet  Still snoring, still sleepy      PAP history   Problems    Mask    Pressure    DME    Machine age    Download See download below       Sleep Clinic New Patient 5/1/2023   What time do you go to bed on a week day? (Give a range) If I have to work about 8 or 9pm. But if I'm off 10 or 11pm   What time do you go to bed on a day off? (Give a range) 10 or 11pm   How long does it take you to fall asleep? (Give a range) Not long   On average, how many times per night do you wake up? 4   How long does it take you to fall back into sleep? (Give a range) Not long   What time do you wake up to start your day on a week day? (Give a range) If I have to  work 2:50am...but if off 7am   What time do you wake up to start your day on a day off? (Give a range) 9am   On average, how many times per night do you go to the bathroom?  4 or 5       Past Medical History:   Diagnosis Date    Benign essential hypertension 10/21/2020    BMI 38.0-38.9,adult 10/21/2020    Essential hypertension     HCV antibody positive 10/26/2020    Mixed hyperlipidemia 10/24/2020    Type 2 diabetes mellitus without complication, without long-term current use of insulin 10/21/2020    Vitamin D insufficiency 10/24/2020     Patient Active Problem List   Diagnosis    Type 2 diabetes mellitus with hyperglycemia, without long-term current use of insulin    Vitamin D insufficiency    Hyperlipidemia associated with type 2 diabetes mellitus    Hypertension associated with diabetes    Drug-induced acute pancreatitis without infection or necrosis    Type 2 diabetes mellitus with obesity       Current Outpatient Medications:     amLODIPine (NORVASC) 10 MG tablet, Take 1 tablet (10 mg total) by mouth once daily., Disp: 90 tablet, Rfl: 3    atorvastatin (LIPITOR) 10 MG tablet, Take 1 tablet (10 mg total) by mouth once daily., Disp: 90 tablet, Rfl: 3    blood sugar diagnostic Strp, test  2 (two) times daily before meals., Disp: 200 strip, Rfl: 4    blood-glucose meter Misc, Use as directed to test blood glucose, Disp: 1 each, Rfl: 0    carvediloL (COREG) 6.25 MG tablet, Take 1 tablet (6.25 mg total) by mouth 2 (two) times daily with meals., Disp: 180 tablet, Rfl: 3    empagliflozin (JARDIANCE) 25 mg tablet, Take 1 tablet (25 mg total) by mouth once daily., Disp: 90 tablet, Rfl: 3    flash glucose sensor (FREESTYLE JOE 2 SENSOR) Kit, 1 each by Misc.(Non-Drug; Combo Route) route every 14 (fourteen) days., Disp: 2 kit, Rfl: 11    insulin degludec (TRESIBA FLEXTOUCH U-100) 100 unit/mL (3 mL) insulin pen, Inject 10 Units into the skin once daily. Can titrate up by 2 units daily - to get to goal fasting sugars of  "100 - 150.  Max total daily dose is 60 units/day., Disp: 15 mL, Rfl: 3    lancets 30 gauge Misc, 1 application by Misc.(Non-Drug; Combo Route) route 2 (two) times daily before meals., Disp: 200 each, Rfl: 4    lancets 33 gauge Misc, use to test twice a day  before meals, Disp: 200 each, Rfl: 4    losartan-hydrochlorothiazide 100-12.5 mg (HYZAAR) 100-12.5 mg Tab, Take 1 tablet by mouth once daily., Disp: 90 tablet, Rfl: 3    metFORMIN (GLUCOPHAGE) 1000 MG tablet, Take 1 tablet (1,000 mg total) by mouth 2 (two) times daily with meals., Disp: 180 tablet, Rfl: 3    pen needle, diabetic 32 gauge x 5/32" Ndle, Use with insulin injection 1 time per day, Disp: 100 each, Rfl: 1       There were no vitals filed for this visit.    Physical Exam:    GEN:   Well-appearing  Psych:  Appropriate affect, demonstrates insight  SKIN:  No rash on the face or bridge of the nose      LABS:   No results found for: "HGB", "CO2"    RECORDS REVIEWED PREVIOUSLY:        ASSESSMENT    PROBLEM DESCRIPTION/ Sx on Presentation Interval Hx STATUS   Severe SOPHIA   Severe on HST  He has had some wt loss  + snoring, +snoring arousals, + witnessed apneas    Still snoring uncontrolled   Daytime Sx   + sleepiness when inactive if inactive at work  ESS 7/24 on intake Still some sleepiness, a little bit better persists   Nocturia   x 4 per sleep period 3-4 times unchanged   Other issues:     PLAN     -we discussed a trial of CPAP and the patient is eager to try  -entered new Rx for auto 6-12, ramp 5 x auto       RTC:  31-90 days  after receiving new machine      The patient was given open opportunity to ask questions and/or express concerns about treatment plan.   All questions/concerns were discussed.     Two patient identifiers used prior to evaluation.           "

## 2024-04-19 ENCOUNTER — PATIENT MESSAGE (OUTPATIENT)
Dept: ADMINISTRATIVE | Facility: HOSPITAL | Age: 39
End: 2024-04-19
Payer: COMMERCIAL

## 2024-05-27 ENCOUNTER — PATIENT MESSAGE (OUTPATIENT)
Dept: ADMINISTRATIVE | Facility: HOSPITAL | Age: 39
End: 2024-05-27
Payer: COMMERCIAL

## 2024-06-24 ENCOUNTER — PATIENT OUTREACH (OUTPATIENT)
Dept: ADMINISTRATIVE | Facility: HOSPITAL | Age: 39
End: 2024-06-24
Payer: COMMERCIAL

## 2024-06-24 ENCOUNTER — TELEPHONE (OUTPATIENT)
Dept: INTERNAL MEDICINE | Facility: CLINIC | Age: 39
End: 2024-06-24
Payer: COMMERCIAL

## 2024-06-24 NOTE — TELEPHONE ENCOUNTER
----- Message from Rachael Bui sent at 6/22/2024 12:11 PM CDT -----  Regarding: Medical Assistance  Contact: Patient  Patient is requesting a call back in reference to getting a prescription for ozempic   Please assist     Patient can be reached at  [phone]

## 2024-06-24 NOTE — PROGRESS NOTES
Population Health Chart Review & Patient Outreach Details      Additional [facility] Health Notes:               Updates Requested / Reviewed:      Care Everywhere, , and Immunizations Reconciliation Completed or Queried: [place]         Health Maintenance Topics Overdue:      [facility] Score: 3     Eye Exam  Hemoglobin A1c  Uncontrolled BP                       Health Maintenance Topic(s) Outreach Outcomes & Actions Taken:    Primary Care Appt - Outreach Outcomes & Actions Taken  : Primary Care Appt Scheduled

## 2024-06-28 ENCOUNTER — LAB VISIT (OUTPATIENT)
Dept: LAB | Facility: HOSPITAL | Age: 39
End: 2024-06-28
Attending: FAMILY MEDICINE
Payer: COMMERCIAL

## 2024-06-28 ENCOUNTER — OFFICE VISIT (OUTPATIENT)
Dept: INTERNAL MEDICINE | Facility: CLINIC | Age: 39
End: 2024-06-28
Payer: COMMERCIAL

## 2024-06-28 VITALS
SYSTOLIC BLOOD PRESSURE: 168 MMHG | HEART RATE: 104 BPM | RESPIRATION RATE: 17 BRPM | BODY MASS INDEX: 37.99 KG/M2 | WEIGHT: 305.56 LBS | DIASTOLIC BLOOD PRESSURE: 102 MMHG | TEMPERATURE: 97 F | OXYGEN SATURATION: 95 % | HEIGHT: 75 IN

## 2024-06-28 DIAGNOSIS — I15.2 HYPERTENSION ASSOCIATED WITH DIABETES: ICD-10-CM

## 2024-06-28 DIAGNOSIS — E11.65 TYPE 2 DIABETES MELLITUS WITH HYPERGLYCEMIA, WITHOUT LONG-TERM CURRENT USE OF INSULIN: ICD-10-CM

## 2024-06-28 DIAGNOSIS — E11.69 TYPE 2 DIABETES MELLITUS WITH OBESITY: ICD-10-CM

## 2024-06-28 DIAGNOSIS — E11.59 HYPERTENSION ASSOCIATED WITH DIABETES: ICD-10-CM

## 2024-06-28 DIAGNOSIS — K85.30 DRUG-INDUCED ACUTE PANCREATITIS WITHOUT INFECTION OR NECROSIS: Primary | ICD-10-CM

## 2024-06-28 DIAGNOSIS — E66.9 TYPE 2 DIABETES MELLITUS WITH OBESITY: ICD-10-CM

## 2024-06-28 LAB
ALBUMIN SERPL BCP-MCNC: 4 G/DL (ref 3.5–5.2)
ALP SERPL-CCNC: 107 U/L (ref 55–135)
ALT SERPL W/O P-5'-P-CCNC: 18 U/L (ref 10–44)
ANION GAP SERPL CALC-SCNC: 12 MMOL/L (ref 8–16)
AST SERPL-CCNC: 16 U/L (ref 10–40)
BILIRUB SERPL-MCNC: 0.5 MG/DL (ref 0.1–1)
BUN SERPL-MCNC: 11 MG/DL (ref 6–20)
CALCIUM SERPL-MCNC: 9.6 MG/DL (ref 8.7–10.5)
CHLORIDE SERPL-SCNC: 100 MMOL/L (ref 95–110)
CO2 SERPL-SCNC: 22 MMOL/L (ref 23–29)
CREAT SERPL-MCNC: 1.1 MG/DL (ref 0.5–1.4)
EST. GFR  (NO RACE VARIABLE): >60 ML/MIN/1.73 M^2
ESTIMATED AVG GLUCOSE: 341 MG/DL (ref 68–131)
GLUCOSE SERPL-MCNC: 386 MG/DL (ref 70–110)
HBA1C MFR BLD: 13.5 % (ref 4–5.6)
POTASSIUM SERPL-SCNC: 3.9 MMOL/L (ref 3.5–5.1)
PROT SERPL-MCNC: 7.8 G/DL (ref 6–8.4)
SODIUM SERPL-SCNC: 134 MMOL/L (ref 136–145)

## 2024-06-28 PROCEDURE — 99999 PR PBB SHADOW E&M-EST. PATIENT-LVL V: CPT | Mod: PBBFAC,,, | Performed by: FAMILY MEDICINE

## 2024-06-28 PROCEDURE — 36415 COLL VENOUS BLD VENIPUNCTURE: CPT | Mod: PO | Performed by: FAMILY MEDICINE

## 2024-06-28 PROCEDURE — 80053 COMPREHEN METABOLIC PANEL: CPT | Performed by: FAMILY MEDICINE

## 2024-06-28 PROCEDURE — 83036 HEMOGLOBIN GLYCOSYLATED A1C: CPT | Performed by: FAMILY MEDICINE

## 2024-06-28 NOTE — PROGRESS NOTES
Subjective     Patient ID: Josh Julio is a 39 y.o. male.    Chief Complaint: discuss ozempic    HPI 39-year-old  male with type 2 diabetes, obesity, and hypertension presents to clinic today to discuss possible use of Ozempic for weight loss.  The patient had previously been followed by Zaira Romo NP for treatment of type 2 diabetes.  He was previously prescribed Trulicity but developed an episode of pancreatitis; therefore, Trulicity was discontinued.  Secondary to the previous episode of pancreatitis GLP1s are contraindicated.  He continues to take Tresiba 10 units daily, metformin 1000 mg b.i.d., and Jardiance 25 mg daily for treatment.  He reports daily blood glucose monitoring.  He does not have as exact numbers but does report occasional low levels which resolved with eating.  He is also treated for hypertension with amlodipine 10 mg daily, carvedilol 6.25 mg b.i.d., and losartan-hydrochlorothiazide 100/12.5 mg daily; however, he has not taken his medication today.  Review of Systems   Constitutional:  Negative for appetite change, chills, fatigue and fever.   HENT:  Negative for nasal congestion, ear pain, hearing loss, postnasal drip, rhinorrhea, sinus pressure/congestion, sore throat and tinnitus.    Eyes:  Negative for redness, itching and visual disturbance.   Respiratory:  Negative for cough, chest tightness and shortness of breath.    Cardiovascular:  Negative for chest pain and palpitations.   Gastrointestinal:  Negative for abdominal pain, constipation, diarrhea, nausea and vomiting.   Genitourinary:  Negative for decreased urine volume, difficulty urinating, dysuria, frequency, hematuria and urgency.   Musculoskeletal:  Negative for back pain, myalgias, neck pain and neck stiffness.   Integumentary:  Negative for rash.   Neurological:  Negative for dizziness, light-headedness and headaches.   Psychiatric/Behavioral: Negative.            Objective     Physical Exam  Vitals  and nursing note reviewed.   Constitutional:       General: He is not in acute distress.     Appearance: He is well-developed. He is obese. He is not diaphoretic.   HENT:      Head: Normocephalic and atraumatic.      Right Ear: External ear normal.      Left Ear: External ear normal.      Nose: Nose normal.      Mouth/Throat:      Pharynx: No oropharyngeal exudate.   Eyes:      General: No scleral icterus.        Right eye: No discharge.         Left eye: No discharge.      Conjunctiva/sclera: Conjunctivae normal.      Pupils: Pupils are equal, round, and reactive to light.   Neck:      Thyroid: No thyromegaly.      Vascular: No JVD.      Trachea: No tracheal deviation.   Cardiovascular:      Rate and Rhythm: Normal rate and regular rhythm.      Heart sounds: Normal heart sounds. No murmur heard.     No friction rub. No gallop.   Pulmonary:      Effort: Pulmonary effort is normal. No respiratory distress.      Breath sounds: Normal breath sounds. No stridor. No wheezing, rhonchi or rales.   Chest:      Chest wall: No tenderness.   Abdominal:      General: Bowel sounds are normal. There is no distension.      Palpations: Abdomen is soft. There is no mass.      Tenderness: There is no abdominal tenderness. There is no guarding or rebound.   Musculoskeletal:         General: No tenderness. Normal range of motion.      Cervical back: Normal range of motion and neck supple.   Lymphadenopathy:      Cervical: No cervical adenopathy.   Skin:     General: Skin is warm and dry.      Coloration: Skin is not pale.      Findings: No erythema or rash.   Neurological:      Mental Status: He is alert and oriented to person, place, and time.   Psychiatric:         Mood and Affect: Mood normal.         Behavior: Behavior normal.         Thought Content: Thought content normal.         Judgment: Judgment normal.            Assessment and Plan     1. Drug-induced acute pancreatitis without infection or necrosis  Overview:  Seen at  [facility]-. Trulicity was stopped.      2. Type 2 diabetes mellitus with hyperglycemia, without long-term current use of insulin  -     Comprehensive Metabolic Panel; Future; Expected date: 06/28/2024  -     Hemoglobin A1C; Future; Expected date: 06/28/2024    3. Type 2 diabetes mellitus with obesity  -     Comprehensive Metabolic Panel; Future; Expected date: 06/28/2024  -     Hemoglobin A1C; Future; Expected date: 06/28/2024    4. Hypertension associated with diabetes  -     Comprehensive Metabolic Panel; Future; Expected date: 06/28/2024  -     Hemoglobin A1C; Future; Expected date: 06/28/2024        1. CMP and A1c now.    2. Continue Tresiba 10 units daily, metformin 1000 mg b.i.d., and Jardiance 25 mg daily as prescribed.    3. Continue amlodipine 10 mg daily, carvedilol 6.25 mg b.i.d., and losartan-hydrochlorothiazide 100/12.5 mg daily.  Encourage daily blood pressure monitoring.    4. Return to clinic as needed or in 1 month for hypertension re-evaluation.             Follow up in about 1 month (around 7/28/2024), or if symptoms worsen or fail to improve, for HTN re-evaluation.

## 2024-06-30 ENCOUNTER — PATIENT MESSAGE (OUTPATIENT)
Dept: INTERNAL MEDICINE | Facility: CLINIC | Age: 39
End: 2024-06-30
Payer: COMMERCIAL

## 2024-06-30 DIAGNOSIS — E66.9 TYPE 2 DIABETES MELLITUS WITH OBESITY: ICD-10-CM

## 2024-06-30 DIAGNOSIS — E11.69 TYPE 2 DIABETES MELLITUS WITH OBESITY: ICD-10-CM

## 2024-06-30 DIAGNOSIS — K85.30 DRUG-INDUCED ACUTE PANCREATITIS WITHOUT INFECTION OR NECROSIS: ICD-10-CM

## 2024-06-30 DIAGNOSIS — E11.65 TYPE 2 DIABETES MELLITUS WITH HYPERGLYCEMIA, WITHOUT LONG-TERM CURRENT USE OF INSULIN: Primary | ICD-10-CM

## 2024-06-30 NOTE — TELEPHONE ENCOUNTER
Please schedule an endocrinology referral for further evaluation of uncontrolled diabetes.  Thank you.

## 2024-07-25 ENCOUNTER — PATIENT OUTREACH (OUTPATIENT)
Dept: ADMINISTRATIVE | Facility: HOSPITAL | Age: 39
End: 2024-07-25
Payer: COMMERCIAL

## 2024-07-25 NOTE — PROGRESS NOTES
Population Health Chart Review & Patient Outreach Details      Additional [facility] Notes:               Updates Requested / Reviewed:      Care Everywhere, , and Immunizations Reconciliation Completed or Queried: [place]         Health Maintenance Topics Overdue:      [facility] Score: 2     Eye Exam  Uncontrolled BP                       Health Maintenance Topic(s) Outreach Outcomes & Actions Taken:    Primary Care Appt - Outreach Outcomes & Actions Taken  : Primary Care Appt Scheduled

## 2024-08-20 ENCOUNTER — PATIENT MESSAGE (OUTPATIENT)
Dept: ADMINISTRATIVE | Facility: HOSPITAL | Age: 39
End: 2024-08-20
Payer: COMMERCIAL

## 2024-09-18 DIAGNOSIS — E11.9 TYPE 2 DIABETES MELLITUS WITHOUT COMPLICATION: ICD-10-CM

## 2024-10-02 DIAGNOSIS — E11.9 TYPE 2 DIABETES MELLITUS WITHOUT COMPLICATION: ICD-10-CM

## 2024-10-03 ENCOUNTER — PATIENT OUTREACH (OUTPATIENT)
Dept: ADMINISTRATIVE | Facility: HOSPITAL | Age: 39
End: 2024-10-03
Payer: COMMERCIAL

## 2024-10-23 DIAGNOSIS — I15.2 HYPERTENSION ASSOCIATED WITH DIABETES: ICD-10-CM

## 2024-10-23 DIAGNOSIS — E11.69 HYPERLIPIDEMIA ASSOCIATED WITH TYPE 2 DIABETES MELLITUS: ICD-10-CM

## 2024-10-23 DIAGNOSIS — E11.65 TYPE 2 DIABETES MELLITUS WITH HYPERGLYCEMIA, WITHOUT LONG-TERM CURRENT USE OF INSULIN: ICD-10-CM

## 2024-10-23 DIAGNOSIS — E11.59 HYPERTENSION ASSOCIATED WITH DIABETES: ICD-10-CM

## 2024-10-23 DIAGNOSIS — E78.5 HYPERLIPIDEMIA ASSOCIATED WITH TYPE 2 DIABETES MELLITUS: ICD-10-CM

## 2024-10-23 RX ORDER — BLOOD-GLUCOSE CONTROL, NORMAL
EACH MISCELLANEOUS
Qty: 200 EACH | Refills: 3 | Status: SHIPPED | OUTPATIENT
Start: 2024-10-23

## 2024-10-23 RX ORDER — METFORMIN HYDROCHLORIDE 1000 MG/1
1000 TABLET ORAL 2 TIMES DAILY WITH MEALS
Qty: 180 TABLET | Refills: 0 | Status: SHIPPED | OUTPATIENT
Start: 2024-10-23 | End: 2025-10-23

## 2024-10-23 NOTE — TELEPHONE ENCOUNTER
Care Due:                  Date            Visit Type   Department     Provider  --------------------------------------------------------------------------------                                EP -                              PRIMARY      MET INTERNAL  Last Visit: 06-      CARE ([facility])   MEDICINE       James Pitts  Next Visit: None Scheduled  None         None Found                                                            Last  Test          Frequency    Reason                     Performed    Due Date  --------------------------------------------------------------------------------    HBA1C.......  6 months...  metFORMIN................  06- 12-    Lipid Panel.  12 months..  atorvastatin.............  08- 08-    [employer] Embedded Care Due Messages. Reference number: [id number].   10/23/2024 10:07:06 AM CDT

## 2024-10-23 NOTE — TELEPHONE ENCOUNTER
Refill Routing Note   Medication(s) are not appropriate for processing by [facility] for the following reason(s):        Required labs outdated  Required vitals abnormal    [facility] action(s):  Defer  Approve     Requires labs : Yes             Appointments  past 12m or future 3m with PCP    Date Provider   Last Visit   6/28/2024 James Pitts MD   Next Visit   Visit date not found James Pitts MD   ED visits in past 90 days: 0        Note composed:6:13 PM 10/23/2024

## 2024-10-24 ENCOUNTER — PATIENT MESSAGE (OUTPATIENT)
Dept: INTERNAL MEDICINE | Facility: CLINIC | Age: 39
End: 2024-10-24
Payer: COMMERCIAL

## 2024-10-24 RX ORDER — AMLODIPINE BESYLATE 10 MG/1
10 TABLET ORAL DAILY
Qty: 90 TABLET | Refills: 2 | Status: SHIPPED | OUTPATIENT
Start: 2024-10-24 | End: 2025-10-24

## 2024-10-24 RX ORDER — LOSARTAN POTASSIUM AND HYDROCHLOROTHIAZIDE 12.5; 1 MG/1; MG/1
1 TABLET ORAL DAILY
Qty: 90 TABLET | Refills: 2 | Status: SHIPPED | OUTPATIENT
Start: 2024-10-24 | End: 2025-10-24

## 2024-10-24 RX ORDER — CARVEDILOL 6.25 MG/1
6.25 TABLET ORAL 2 TIMES DAILY WITH MEALS
Qty: 180 TABLET | Refills: 2 | Status: SHIPPED | OUTPATIENT
Start: 2024-10-24 | End: 2025-10-24

## 2024-10-24 RX ORDER — ATORVASTATIN CALCIUM 10 MG/1
10 TABLET, FILM COATED ORAL DAILY
Qty: 90 TABLET | Refills: 0 | Status: SHIPPED | OUTPATIENT
Start: 2024-10-24 | End: 2025-10-24

## 2024-11-01 ENCOUNTER — OFFICE VISIT (OUTPATIENT)
Dept: SLEEP MEDICINE | Facility: CLINIC | Age: 39
End: 2024-11-01
Attending: PSYCHIATRY & NEUROLOGY
Payer: COMMERCIAL

## 2024-11-01 VITALS
HEIGHT: 75 IN | BODY MASS INDEX: 39.04 KG/M2 | WEIGHT: 314 LBS | SYSTOLIC BLOOD PRESSURE: 203 MMHG | HEART RATE: 107 BPM | DIASTOLIC BLOOD PRESSURE: 128 MMHG

## 2024-11-01 DIAGNOSIS — E11.59 HYPERTENSION ASSOCIATED WITH DIABETES: Primary | ICD-10-CM

## 2024-11-01 DIAGNOSIS — I15.2 HYPERTENSION ASSOCIATED WITH DIABETES: Primary | ICD-10-CM

## 2024-11-01 DIAGNOSIS — G47.33 OSA (OBSTRUCTIVE SLEEP APNEA): ICD-10-CM

## 2024-11-01 PROCEDURE — 99999 PR PBB SHADOW E&M-EST. PATIENT-LVL III: CPT | Mod: PBBFAC,,, | Performed by: NURSE PRACTITIONER

## 2024-11-11 ENCOUNTER — ON-DEMAND VIRTUAL (OUTPATIENT)
Dept: URGENT CARE | Facility: CLINIC | Age: 39
End: 2024-11-11
Payer: COMMERCIAL

## 2024-11-11 DIAGNOSIS — J06.9 UPPER RESPIRATORY TRACT INFECTION, UNSPECIFIED TYPE: Primary | ICD-10-CM

## 2024-11-11 PROCEDURE — 99212 OFFICE O/P EST SF 10 MIN: CPT | Mod: 95,,, | Performed by: INTERNAL MEDICINE

## 2024-11-11 NOTE — PROGRESS NOTES
Subjective:      Patient ID: Josh Julio is a 39 y.o. male.    Vitals:  vitals were not taken for this visit.     Chief Complaint: Cough      Visit Type: TELE AUDIOVISUAL    Present with the patient at the time of consultation: TELEMED PRESENT WITH PATIENT: None    Past Medical History:   Diagnosis Date    Benign essential hypertension 10/21/2020    BMI 38.0-38.9,adult 10/21/2020    Essential hypertension     HCV antibody positive 10/26/2020    Mixed hyperlipidemia 10/24/2020    Type 2 diabetes mellitus without complication, without long-term current use of insulin 10/21/2020    Vitamin D insufficiency 10/24/2020     History reviewed. No pertinent surgical history.  Review of patient's allergies indicates:   Allergen Reactions    Trulicity [dulaglutide] Other (See Comments)     Acute pancreatitis at [facility] ED 8-     Current Outpatient Medications on File Prior to Visit   Medication Sig Dispense Refill    amLODIPine (NORVASC) 10 MG tablet Take 1 tablet (10 mg total) by mouth once daily. 90 tablet 2    atorvastatin (LIPITOR) 10 MG tablet Take 1 tablet (10 mg total) by mouth once daily. 90 tablet 0    blood sugar diagnostic (TRUE METRIX GLUCOSE TEST STRIP) Strp test  2 (two) times daily before meals. 200 strip 3    blood-glucose meter Misc Use as directed to test blood glucose 1 each 0    carvediloL (COREG) 6.25 MG tablet Take 1 tablet (6.25 mg total) by mouth 2 (two) times daily with meals. 180 tablet 2    insulin degludec (TRESIBA FLEXTOUCH U-100) 100 unit/mL (3 mL) insulin pen Inject 10 Units into the skin once daily. Can titrate up by 2 units daily - to get to goal fasting sugars of 100 - 150.   Max total daily dose is 60 units/day. 15 mL 3    lancets (EASY TOUCH TWIST LANCETS) 30 gauge Misc 1 application by Misc.(Non-Drug; Combo Route) route 2 (two) times daily before meals. 200 each 3    lancets 33 gauge Misc use to test twice a day  before meals 200 each 4    losartan-hydrochlorothiazide 100-12.5 mg  "(HYZAAR) 100-12.5 mg Tab Take 1 tablet by mouth once daily. 90 tablet 2    metFORMIN (GLUCOPHAGE) 1000 MG tablet Take 1 tablet (1,000 mg total) by mouth 2 (two) times daily with meals. 180 tablet 0    pen needle, diabetic 32 gauge x 5/32" Ndle Use with insulin injection 1 time per day 100 each 1     No current facility-administered medications on file prior to visit.     Family History   Problem Relation Name Age of Onset    Hypertension Mother      No Known Problems Father      Diabetes Maternal Grandmother      Hypertension Maternal Grandmother      Kidney disease Maternal Grandmother      Hypertension Brother             [facility] P[facility] Odvv Intake    11/11/2024  9:25 AM CST - Filed by Patient   What is your current physical address in the event of a medical emergency? [address]   Are you able to take your vital signs? No   Please attach any relevant images or files    Is your employer contracted with [facility]? No         In [place] via video conference in his car    40 y/o man with a 4 day history of headache, body aches, congestion, and cough. He feels feverish some days. He has not taken a COVID test.     Cough  Associated symptoms include a sore throat. Pertinent negatives include no fever.       Constitution: Positive for generalized weakness. Negative for fever.   HENT:  Positive for congestion and sore throat.    Respiratory:  Positive for cough.         Objective:   The physical exam was conducted virtually.  Physical Exam   Constitutional:  Non-toxic appearance. No distress.   HENT:   Nose: Rhinorrhea and congestion present.   Pulmonary/Chest: No stridor. No respiratory distress.         Comments: Normal verbal pietro without respiratory compromise.     Neurological: He is alert.   No other pertinent findings on examination.     Assessment:     1. Upper respiratory tract infection, unspecified type        Plan:       Upper respiratory tract infection, unspecified type    This could be " influenza, COVID, or another respiratory virus. The only way to tell based on your symptoms is to get tested for COVID and influenza. There are home tests for this or you can go into urgent care and get tested. If you do the home test you can come back on here and we can treat you if one of these is positive, or at urgent care if you are positive they can treat you with medications as well. If you worsen acutely go in for an in person visit.

## 2024-11-18 ENCOUNTER — PATIENT OUTREACH (OUTPATIENT)
Dept: ADMINISTRATIVE | Facility: HOSPITAL | Age: 39
End: 2024-11-18
Payer: COMMERCIAL

## 2024-12-27 ENCOUNTER — PATIENT MESSAGE (OUTPATIENT)
Dept: ADMINISTRATIVE | Facility: HOSPITAL | Age: 39
End: 2024-12-27
Payer: COMMERCIAL

## 2025-01-25 ENCOUNTER — TELEPHONE (OUTPATIENT)
Dept: OPTOMETRY | Facility: CLINIC | Age: 40
End: 2025-01-25
Payer: COMMERCIAL

## 2025-02-10 ENCOUNTER — E-VISIT (OUTPATIENT)
Dept: INTERNAL MEDICINE | Facility: CLINIC | Age: 40
End: 2025-02-10
Payer: COMMERCIAL

## 2025-02-10 DIAGNOSIS — L21.9 SEBORRHEIC DERMATITIS: Primary | ICD-10-CM

## 2025-02-10 RX ORDER — KETOCONAZOLE 20 MG/ML
SHAMPOO, SUSPENSION TOPICAL
Qty: 240 ML | Refills: 0 | Status: SHIPPED | OUTPATIENT
Start: 2025-02-10

## 2025-02-10 NOTE — PROGRESS NOTES
Patient ID: Josh Julio is a 39 y.o. male.    Chief Complaint: General Illness (Entered automatically based on patient selection in [employer].)    The patient initiated a request through [employer] on 2/10/2025 for evaluation and management with a chief complaint of General Illness (Entered automatically based on patient selection in [employer].)     I evaluated the questionnaire submission on 2/10/2025.    [facility] Peq Evisit [facility]-Skin Hair Nails    2/10/2025  4:35 PM CST - Filed by Patient   What do you need help with? Hair   What concern do you have about your hair? Other Concern   Do you agree to participate in an E-Visit? Yes   If you have any of the following symptoms, please present to your local emergency room or call 911:  I acknowledge   What is the main issue you would like addressed today? Trying to get a  Prescription back   Please describe your symptoms Bumps on back of neck   Where is your problem located? Back of neck   How severe are your symptoms? Moderate   Have you had these symptoms before? Yes   How long have you been having these symptoms? For a few days   Please list any medications or treatments you have used for your condition and indicate if it was effective or not. Na   What makes this feel better? That shampoo   What makes this feel worse? Na   Are these symptoms related to a condition that you currently have? No   Please describe any probable cause for these symptoms Na   Provide any additional information you feel is important. Na   Please attach any relevant images or files    Are you able to take your vital signs? No         Encounter Diagnosis   Name Primary?    Seborrheic dermatitis Yes        No orders of the defined types were placed in this encounter.     Medications Ordered This Encounter   Medications    ketoconazole (NIZORAL) 2 % shampoo     Sig: Apply topically twice a week.     Dispense:  240 mL     Refill:  0        No follow-ups on file.      E-Visit Time Tracking:    Day  1 Time (in minutes): 5    Total Time (in minutes): 5

## 2025-02-18 ENCOUNTER — PATIENT MESSAGE (OUTPATIENT)
Dept: ADMINISTRATIVE | Facility: HOSPITAL | Age: 40
End: 2025-02-18
Payer: COMMERCIAL

## 2025-02-21 ENCOUNTER — PATIENT OUTREACH (OUTPATIENT)
Dept: ADMINISTRATIVE | Facility: HOSPITAL | Age: 40
End: 2025-02-21
Payer: COMMERCIAL

## 2025-02-21 DIAGNOSIS — E11.65 TYPE 2 DIABETES MELLITUS WITH HYPERGLYCEMIA, WITHOUT LONG-TERM CURRENT USE OF INSULIN: Primary | ICD-10-CM

## 2025-05-19 ENCOUNTER — PATIENT MESSAGE (OUTPATIENT)
Dept: ADMINISTRATIVE | Facility: HOSPITAL | Age: 40
End: 2025-05-19
Payer: COMMERCIAL

## 2025-05-29 ENCOUNTER — PATIENT OUTREACH (OUTPATIENT)
Dept: ADMINISTRATIVE | Facility: HOSPITAL | Age: 40
End: 2025-05-29
Payer: COMMERCIAL

## 2025-05-29 DIAGNOSIS — E11.65 TYPE 2 DIABETES MELLITUS WITH HYPERGLYCEMIA, WITHOUT LONG-TERM CURRENT USE OF INSULIN: Primary | ICD-10-CM

## 2025-06-03 ENCOUNTER — OFFICE VISIT (OUTPATIENT)
Dept: INTERNAL MEDICINE | Facility: CLINIC | Age: 40
End: 2025-06-03
Payer: COMMERCIAL

## 2025-06-03 DIAGNOSIS — E66.812 CLASS 2 SEVERE OBESITY WITH SERIOUS COMORBIDITY AND BODY MASS INDEX (BMI) OF 38.0 TO 38.9 IN ADULT, UNSPECIFIED OBESITY TYPE: ICD-10-CM

## 2025-06-03 DIAGNOSIS — E78.5 HYPERLIPIDEMIA ASSOCIATED WITH TYPE 2 DIABETES MELLITUS: ICD-10-CM

## 2025-06-03 DIAGNOSIS — E66.01 CLASS 2 SEVERE OBESITY WITH SERIOUS COMORBIDITY AND BODY MASS INDEX (BMI) OF 38.0 TO 38.9 IN ADULT, UNSPECIFIED OBESITY TYPE: ICD-10-CM

## 2025-06-03 DIAGNOSIS — I15.2 HYPERTENSION ASSOCIATED WITH DIABETES: ICD-10-CM

## 2025-06-03 DIAGNOSIS — Z00.00 WELL ADULT EXAM: Primary | ICD-10-CM

## 2025-06-03 DIAGNOSIS — E11.69 HYPERLIPIDEMIA ASSOCIATED WITH TYPE 2 DIABETES MELLITUS: ICD-10-CM

## 2025-06-03 DIAGNOSIS — E55.9 VITAMIN D INSUFFICIENCY: ICD-10-CM

## 2025-06-03 DIAGNOSIS — Z12.5 PROSTATE CANCER SCREENING: ICD-10-CM

## 2025-06-03 DIAGNOSIS — E11.65 TYPE 2 DIABETES MELLITUS WITH HYPERGLYCEMIA, WITHOUT LONG-TERM CURRENT USE OF INSULIN: ICD-10-CM

## 2025-06-03 DIAGNOSIS — E11.59 HYPERTENSION ASSOCIATED WITH DIABETES: ICD-10-CM

## 2025-06-03 DIAGNOSIS — N52.9 ERECTILE DYSFUNCTION, UNSPECIFIED ERECTILE DYSFUNCTION TYPE: Primary | ICD-10-CM

## 2025-06-03 PROCEDURE — G2211 COMPLEX E/M VISIT ADD ON: HCPCS | Mod: 95,,, | Performed by: FAMILY MEDICINE

## 2025-06-03 PROCEDURE — 1159F MED LIST DOCD IN RCRD: CPT | Mod: CPTII,95,, | Performed by: FAMILY MEDICINE

## 2025-06-03 PROCEDURE — 98004 SYNCH AUDIO-VIDEO EST SF 10: CPT | Mod: 95,,, | Performed by: FAMILY MEDICINE

## 2025-06-03 RX ORDER — SILDENAFIL 100 MG/1
100 TABLET, FILM COATED ORAL DAILY PRN
Qty: 30 TABLET | Refills: 11 | Status: SHIPPED | OUTPATIENT
Start: 2025-06-03 | End: 2026-06-03

## 2025-06-09 ENCOUNTER — PATIENT OUTREACH (OUTPATIENT)
Dept: ADMINISTRATIVE | Facility: HOSPITAL | Age: 40
End: 2025-06-09
Payer: COMMERCIAL

## 2025-06-09 DIAGNOSIS — E11.9 TYPE 2 DIABETES MELLITUS WITHOUT COMPLICATION, UNSPECIFIED WHETHER LONG TERM INSULIN USE: Primary | ICD-10-CM

## 2025-08-23 ENCOUNTER — PATIENT MESSAGE (OUTPATIENT)
Dept: ADMINISTRATIVE | Facility: HOSPITAL | Age: 40
End: 2025-08-23
Payer: COMMERCIAL

## 2025-09-02 ENCOUNTER — PATIENT OUTREACH (OUTPATIENT)
Dept: ADMINISTRATIVE | Facility: HOSPITAL | Age: 40
End: 2025-09-02
Payer: COMMERCIAL